# Patient Record
Sex: FEMALE | URBAN - METROPOLITAN AREA
[De-identification: names, ages, dates, MRNs, and addresses within clinical notes are randomized per-mention and may not be internally consistent; named-entity substitution may affect disease eponyms.]

---

## 2018-07-27 PROCEDURE — 88305 TISSUE EXAM BY PATHOLOGIST: CPT | Performed by: INTERNAL MEDICINE

## 2019-10-19 PROBLEM — K59.9 BOWEL DYSFUNCTION: Status: ACTIVE | Noted: 2019-10-19

## 2022-04-28 ENCOUNTER — APPOINTMENT (OUTPATIENT)
Dept: URBAN - METROPOLITAN AREA CLINIC 249 | Age: 72
Setting detail: DERMATOLOGY
End: 2022-04-28

## 2022-04-28 DIAGNOSIS — Z41.9 ENCOUNTER FOR PROCEDURE FOR PURPOSES OTHER THAN REMEDYING HEALTH STATE, UNSPECIFIED: ICD-10-CM

## 2022-04-28 PROCEDURE — OTHER CHEMICAL PEEL: OTHER

## 2022-04-28 NOTE — PROCEDURE: CHEMICAL PEEL
Price (Use Numbers Only, No Special Characters Or $): 922 Price (Use Numbers Only, No Special Characters Or $): 815

## 2022-06-23 ENCOUNTER — APPOINTMENT (OUTPATIENT)
Dept: URBAN - METROPOLITAN AREA CLINIC 249 | Age: 72
Setting detail: DERMATOLOGY
End: 2022-06-23

## 2022-06-23 DIAGNOSIS — Z41.9 ENCOUNTER FOR PROCEDURE FOR PURPOSES OTHER THAN REMEDYING HEALTH STATE, UNSPECIFIED: ICD-10-CM

## 2022-06-23 PROCEDURE — OTHER PERMEA LASER: OTHER

## 2022-06-23 ASSESSMENT — LOCATION DETAILED DESCRIPTION DERM
LOCATION DETAILED: LEFT LOWER CUTANEOUS LIP
LOCATION DETAILED: SUPERIOR MID FOREHEAD
LOCATION DETAILED: RIGHT INFERIOR CENTRAL MALAR CHEEK
LOCATION DETAILED: LEFT INFERIOR CENTRAL MALAR CHEEK

## 2022-06-23 ASSESSMENT — LOCATION ZONE DERM
LOCATION ZONE: FACE
LOCATION ZONE: LIP

## 2022-06-23 ASSESSMENT — LOCATION SIMPLE DESCRIPTION DERM
LOCATION SIMPLE: SUPERIOR FOREHEAD
LOCATION SIMPLE: RIGHT CHEEK
LOCATION SIMPLE: LEFT LIP
LOCATION SIMPLE: LEFT CHEEK

## 2022-06-23 NOTE — PROCEDURE: PERMEA LASER
Energy Level: High
Consent: Written consent obtained, risks reviewed including but not limited to crusting, scabbing, blistering, scarring, darker or lighter pigmentary change, incidental hair removal, bruising, and/or incomplete removal.
Laser Type: Raquela
Detail Level: Zone
Price (Use Numbers Only, No Special Characters Or $): 400
Topical Anesthesia?: 7% lidocaine, 7% tetracaine
Post-Procedure Care: Permea on face and neck. Phyto and Elta sunscreen. Purchased CHARY 1/2  off. First done.
Post-Care Instructions: I reviewed with the patient in detail post-care instructions. Patient should stay away from the sun and wear sun protection until treated areas are fully healed.

## 2022-07-13 ENCOUNTER — APPOINTMENT (OUTPATIENT)
Dept: URBAN - METROPOLITAN AREA CLINIC 249 | Age: 72
Setting detail: DERMATOLOGY
End: 2022-07-13

## 2022-07-13 DIAGNOSIS — L72.0 EPIDERMAL CYST: ICD-10-CM

## 2022-07-13 DIAGNOSIS — L57.8 OTHER SKIN CHANGES DUE TO CHRONIC EXPOSURE TO NONIONIZING RADIATION: ICD-10-CM

## 2022-07-13 PROCEDURE — OTHER COUNSELING: OTHER

## 2022-07-13 PROCEDURE — 99213 OFFICE O/P EST LOW 20 MIN: CPT

## 2022-07-13 ASSESSMENT — LOCATION SIMPLE DESCRIPTION DERM
LOCATION SIMPLE: LEFT CHEEK
LOCATION SIMPLE: LEFT UPPER ARM
LOCATION SIMPLE: RIGHT UPPER ARM
LOCATION SIMPLE: RIGHT PRETIBIAL REGION
LOCATION SIMPLE: RIGHT CHEEK
LOCATION SIMPLE: RIGHT FOREHEAD
LOCATION SIMPLE: LEFT PRETIBIAL REGION

## 2022-07-13 ASSESSMENT — LOCATION ZONE DERM
LOCATION ZONE: LEG
LOCATION ZONE: ARM
LOCATION ZONE: FACE

## 2022-07-13 ASSESSMENT — LOCATION DETAILED DESCRIPTION DERM
LOCATION DETAILED: LEFT CENTRAL MALAR CHEEK
LOCATION DETAILED: LEFT PROXIMAL PRETIBIAL REGION
LOCATION DETAILED: RIGHT CENTRAL MALAR CHEEK
LOCATION DETAILED: RIGHT PROXIMAL PRETIBIAL REGION
LOCATION DETAILED: RIGHT ANTERIOR PROXIMAL UPPER ARM
LOCATION DETAILED: RIGHT INFERIOR LATERAL FOREHEAD
LOCATION DETAILED: LEFT ANTERIOR DISTAL UPPER ARM

## 2022-08-25 ENCOUNTER — APPOINTMENT (OUTPATIENT)
Dept: URBAN - METROPOLITAN AREA CLINIC 249 | Age: 72
Setting detail: DERMATOLOGY
End: 2022-08-25

## 2022-08-25 DIAGNOSIS — Z41.9 ENCOUNTER FOR PROCEDURE FOR PURPOSES OTHER THAN REMEDYING HEALTH STATE, UNSPECIFIED: ICD-10-CM

## 2022-08-25 PROCEDURE — OTHER PERMEA LASER: OTHER

## 2022-08-25 ASSESSMENT — LOCATION ZONE DERM
LOCATION ZONE: NOSE
LOCATION ZONE: NECK
LOCATION ZONE: HAND
LOCATION ZONE: FACE

## 2022-08-25 ASSESSMENT — LOCATION DETAILED DESCRIPTION DERM
LOCATION DETAILED: RIGHT ULNAR DORSAL HAND
LOCATION DETAILED: LEFT FOREHEAD
LOCATION DETAILED: LEFT RADIAL DORSAL HAND
LOCATION DETAILED: LEFT INFERIOR ANTERIOR NECK
LOCATION DETAILED: NASAL DORSUM
LOCATION DETAILED: RIGHT CENTRAL MALAR CHEEK
LOCATION DETAILED: RIGHT FOREHEAD
LOCATION DETAILED: LEFT INFERIOR CENTRAL MALAR CHEEK
LOCATION DETAILED: LEFT CHIN

## 2022-08-25 ASSESSMENT — LOCATION SIMPLE DESCRIPTION DERM
LOCATION SIMPLE: RIGHT HAND
LOCATION SIMPLE: NOSE
LOCATION SIMPLE: LEFT HAND
LOCATION SIMPLE: LEFT FOREHEAD
LOCATION SIMPLE: RIGHT FOREHEAD
LOCATION SIMPLE: LEFT CHEEK
LOCATION SIMPLE: CHIN
LOCATION SIMPLE: RIGHT CHEEK
LOCATION SIMPLE: LEFT ANTERIOR NECK

## 2022-08-25 NOTE — PROCEDURE: PERMEA LASER
Length Of Topical Anesthesia Application (Optional): 15 minutes
Consent: Written consent obtained, risks reviewed including but not limited to crusting, scabbing, blistering, scarring, darker or lighter pigmentary change, incidental hair removal, bruising, and/or incomplete removal.
Post-Care Instructions: I reviewed with the patient in detail post-care instructions. Patient should stay away from the sun and wear sun protection until treated areas are fully healed.
Post-Procedure Care: Permea on face, neck and hands. 2nd of radha 1/2 off.
Detail Level: Zone
Laser Type: Raquela
Price (Use Numbers Only, No Special Characters Or $): 400
Energy Level: High
Topical Anesthesia?: 7% lidocaine, 7% tetracaine

## 2022-09-09 ENCOUNTER — APPOINTMENT (OUTPATIENT)
Dept: URBAN - METROPOLITAN AREA CLINIC 249 | Age: 72
Setting detail: DERMATOLOGY
End: 2022-09-09

## 2022-09-09 DIAGNOSIS — Z41.9 ENCOUNTER FOR PROCEDURE FOR PURPOSES OTHER THAN REMEDYING HEALTH STATE, UNSPECIFIED: ICD-10-CM

## 2022-09-09 PROCEDURE — OTHER CHEMICAL PEEL: OTHER

## 2022-09-09 ASSESSMENT — LOCATION DETAILED DESCRIPTION DERM
LOCATION DETAILED: SUPERIOR MID FOREHEAD
LOCATION DETAILED: LEFT MEDIAL SUPERIOR CHEST
LOCATION DETAILED: LEFT INFERIOR ANTERIOR NECK
LOCATION DETAILED: LEFT CHIN
LOCATION DETAILED: NASAL TIP
LOCATION DETAILED: PHILTRUM
LOCATION DETAILED: LEFT RADIAL DORSAL HAND
LOCATION DETAILED: RIGHT INFERIOR CENTRAL MALAR CHEEK
LOCATION DETAILED: LEFT INFERIOR CENTRAL MALAR CHEEK
LOCATION DETAILED: RIGHT RADIAL DORSAL HAND

## 2022-09-09 ASSESSMENT — LOCATION SIMPLE DESCRIPTION DERM
LOCATION SIMPLE: LEFT HAND
LOCATION SIMPLE: LEFT CHEEK
LOCATION SIMPLE: SUPERIOR FOREHEAD
LOCATION SIMPLE: RIGHT HAND
LOCATION SIMPLE: CHEST
LOCATION SIMPLE: CHIN
LOCATION SIMPLE: RIGHT CHEEK
LOCATION SIMPLE: LEFT ANTERIOR NECK
LOCATION SIMPLE: NOSE
LOCATION SIMPLE: UPPER LIP

## 2022-09-09 ASSESSMENT — LOCATION ZONE DERM
LOCATION ZONE: NECK
LOCATION ZONE: LIP
LOCATION ZONE: HAND
LOCATION ZONE: FACE
LOCATION ZONE: TRUNK
LOCATION ZONE: NOSE

## 2022-09-09 NOTE — PROCEDURE: CHEMICAL PEEL
Price (Use Numbers Only, No Special Characters Or $): 848 Price (Use Numbers Only, No Special Characters Or $): 107

## 2024-04-24 RX ORDER — TROSPIUM CHLORIDE 20 MG/1
20 TABLET, FILM COATED ORAL 2 TIMES DAILY
Status: ON HOLD | COMMUNITY
End: 2024-05-17

## 2024-04-24 RX ORDER — LEVOTHYROXINE SODIUM 125 UG/ML
125 SOLUTION ORAL DAILY
COMMUNITY

## 2024-04-24 RX ORDER — DIAZEPAM 5 MG/1
5 TABLET ORAL DAILY
COMMUNITY

## 2024-04-24 RX ORDER — BACLOFEN 10 MG/1
10 TABLET ORAL DAILY
COMMUNITY
End: 2024-05-24

## 2024-05-09 ENCOUNTER — LAB ENCOUNTER (OUTPATIENT)
Dept: LAB | Age: 74
End: 2024-05-09
Attending: UROLOGY
Payer: MEDICARE

## 2024-05-09 DIAGNOSIS — R10.2 PELVIC PAIN IN FEMALE: ICD-10-CM

## 2024-05-09 LAB
ANTIBODY SCREEN: NEGATIVE
RH BLOOD TYPE: POSITIVE

## 2024-05-09 PROCEDURE — 86850 RBC ANTIBODY SCREEN: CPT

## 2024-05-09 PROCEDURE — 86900 BLOOD TYPING SEROLOGIC ABO: CPT

## 2024-05-09 PROCEDURE — 86901 BLOOD TYPING SEROLOGIC RH(D): CPT

## 2024-05-17 ENCOUNTER — ANESTHESIA EVENT (OUTPATIENT)
Dept: SURGERY | Facility: HOSPITAL | Age: 74
DRG: 654 | End: 2024-05-17

## 2024-05-17 ENCOUNTER — HOSPITAL ENCOUNTER (INPATIENT)
Facility: HOSPITAL | Age: 74
LOS: 7 days | Discharge: HOME HEALTH | DRG: 654 | End: 2024-05-24
Attending: UROLOGY | Admitting: UROLOGY

## 2024-05-17 ENCOUNTER — ANESTHESIA (OUTPATIENT)
Dept: SURGERY | Facility: HOSPITAL | Age: 74
DRG: 654 | End: 2024-05-17

## 2024-05-17 DIAGNOSIS — R10.2 PELVIC PAIN IN FEMALE: Primary | ICD-10-CM

## 2024-05-17 LAB
BASE EXCESS BLD CALC-SCNC: -3 MMOL/L
CA-I BLD-SCNC: 1.21 MMOL/L (ref 1.12–1.32)
CO2 BLD-SCNC: 23 MMOL/L (ref 22–32)
GLUCOSE BLD-MCNC: 122 MG/DL (ref 70–99)
HCO3 BLD-SCNC: 21.5 MEQ/L
HCT VFR BLD CALC: 32 %
PCO2 BLD: 34.7 MMHG
PH BLD: 7.4 [PH]
PO2 BLD: 546 MMHG
POTASSIUM BLD-SCNC: 3.1 MMOL/L (ref 3.6–5.1)
RH BLOOD TYPE: POSITIVE
SAO2 % BLD: 100 %
SODIUM BLD-SCNC: 140 MMOL/L (ref 136–145)

## 2024-05-17 PROCEDURE — 84295 ASSAY OF SERUM SODIUM: CPT

## 2024-05-17 PROCEDURE — 0JPT0WZ REMOVAL OF TOTALLY IMPLANTABLE VASCULAR ACCESS DEVICE FROM TRUNK SUBCUTANEOUS TISSUE AND FASCIA, OPEN APPROACH: ICD-10-PCS | Performed by: SURGERY

## 2024-05-17 PROCEDURE — 85014 HEMATOCRIT: CPT

## 2024-05-17 PROCEDURE — 84132 ASSAY OF SERUM POTASSIUM: CPT

## 2024-05-17 PROCEDURE — 8E0W4CZ ROBOTIC ASSISTED PROCEDURE OF TRUNK REGION, PERCUTANEOUS ENDOSCOPIC APPROACH: ICD-10-PCS | Performed by: UROLOGY

## 2024-05-17 PROCEDURE — 0T184ZC BYPASS BILATERAL URETERS TO ILEOCUTANEOUS, PERCUTANEOUS ENDOSCOPIC APPROACH: ICD-10-PCS | Performed by: UROLOGY

## 2024-05-17 PROCEDURE — 76942 ECHO GUIDE FOR BIOPSY: CPT | Performed by: STUDENT IN AN ORGANIZED HEALTH CARE EDUCATION/TRAINING PROGRAM

## 2024-05-17 PROCEDURE — 0TTB4ZZ RESECTION OF BLADDER, PERCUTANEOUS ENDOSCOPIC APPROACH: ICD-10-PCS | Performed by: UROLOGY

## 2024-05-17 PROCEDURE — 82330 ASSAY OF CALCIUM: CPT

## 2024-05-17 PROCEDURE — 03HY32Z INSERTION OF MONITORING DEVICE INTO UPPER ARTERY, PERCUTANEOUS APPROACH: ICD-10-PCS | Performed by: STUDENT IN AN ORGANIZED HEALTH CARE EDUCATION/TRAINING PROGRAM

## 2024-05-17 PROCEDURE — 3E0T3BZ INTRODUCTION OF ANESTHETIC AGENT INTO PERIPHERAL NERVES AND PLEXI, PERCUTANEOUS APPROACH: ICD-10-PCS | Performed by: STUDENT IN AN ORGANIZED HEALTH CARE EDUCATION/TRAINING PROGRAM

## 2024-05-17 PROCEDURE — 4A133B1 MONITORING OF ARTERIAL PRESSURE, PERIPHERAL, PERCUTANEOUS APPROACH: ICD-10-PCS | Performed by: STUDENT IN AN ORGANIZED HEALTH CARE EDUCATION/TRAINING PROGRAM

## 2024-05-17 PROCEDURE — 4A133J1 MONITORING OF ARTERIAL PULSE, PERIPHERAL, PERCUTANEOUS APPROACH: ICD-10-PCS | Performed by: STUDENT IN AN ORGANIZED HEALTH CARE EDUCATION/TRAINING PROGRAM

## 2024-05-17 PROCEDURE — 82803 BLOOD GASES ANY COMBINATION: CPT

## 2024-05-17 PROCEDURE — 05PY03Z REMOVAL OF INFUSION DEVICE FROM UPPER VEIN, OPEN APPROACH: ICD-10-PCS | Performed by: SURGERY

## 2024-05-17 PROCEDURE — 88309 TISSUE EXAM BY PATHOLOGIST: CPT | Performed by: UROLOGY

## 2024-05-17 DEVICE — POLYURETHANE FEEDING TUBE RADIOPAQUE
Type: IMPLANTABLE DEVICE | Site: URETER | Status: FUNCTIONAL
Brand: KANGAROO

## 2024-05-17 RX ORDER — HYDROMORPHONE HYDROCHLORIDE 1 MG/ML
0.4 INJECTION, SOLUTION INTRAMUSCULAR; INTRAVENOUS; SUBCUTANEOUS EVERY 5 MIN PRN
Status: DISCONTINUED | OUTPATIENT
Start: 2024-05-17 | End: 2024-05-17 | Stop reason: HOSPADM

## 2024-05-17 RX ORDER — ACETAMINOPHEN 10 MG/ML
1000 INJECTION, SOLUTION INTRAVENOUS ONCE
Status: DISCONTINUED | OUTPATIENT
Start: 2024-05-17 | End: 2024-05-17

## 2024-05-17 RX ORDER — HYDROCODONE BITARTRATE AND ACETAMINOPHEN 5; 325 MG/1; MG/1
1 TABLET ORAL ONCE AS NEEDED
Status: DISCONTINUED | OUTPATIENT
Start: 2024-05-17 | End: 2024-05-17 | Stop reason: HOSPADM

## 2024-05-17 RX ORDER — MINERAL OIL AND PETROLATUM 150; 830 MG/G; MG/G
OINTMENT OPHTHALMIC AS NEEDED
Status: DISCONTINUED | OUTPATIENT
Start: 2024-05-17 | End: 2024-05-17

## 2024-05-17 RX ORDER — FAMOTIDINE 10 MG/ML
20 INJECTION, SOLUTION INTRAVENOUS 2 TIMES DAILY
Status: DISCONTINUED | OUTPATIENT
Start: 2024-05-17 | End: 2024-05-24

## 2024-05-17 RX ORDER — ONDANSETRON 2 MG/ML
4 INJECTION INTRAMUSCULAR; INTRAVENOUS EVERY 6 HOURS PRN
Status: DISCONTINUED | OUTPATIENT
Start: 2024-05-17 | End: 2024-05-24

## 2024-05-17 RX ORDER — BUPIVACAINE HYDROCHLORIDE AND EPINEPHRINE 2.5; 5 MG/ML; UG/ML
INJECTION, SOLUTION EPIDURAL; INFILTRATION; INTRACAUDAL; PERINEURAL AS NEEDED
Status: DISCONTINUED | OUTPATIENT
Start: 2024-05-17 | End: 2024-05-17 | Stop reason: SURG

## 2024-05-17 RX ORDER — KETOROLAC TROMETHAMINE 30 MG/ML
INJECTION, SOLUTION INTRAMUSCULAR; INTRAVENOUS AS NEEDED
Status: DISCONTINUED | OUTPATIENT
Start: 2024-05-17 | End: 2024-05-17 | Stop reason: SURG

## 2024-05-17 RX ORDER — ACETAMINOPHEN 500 MG
1000 TABLET ORAL ONCE AS NEEDED
Status: DISCONTINUED | OUTPATIENT
Start: 2024-05-17 | End: 2024-05-17 | Stop reason: HOSPADM

## 2024-05-17 RX ORDER — OXYCODONE HYDROCHLORIDE 10 MG/1
5 TABLET ORAL EVERY 4 HOURS PRN
Status: DISCONTINUED | OUTPATIENT
Start: 2024-05-17 | End: 2024-05-24

## 2024-05-17 RX ORDER — HYDROMORPHONE HYDROCHLORIDE 1 MG/ML
0.4 INJECTION, SOLUTION INTRAMUSCULAR; INTRAVENOUS; SUBCUTANEOUS EVERY 2 HOUR PRN
Status: DISCONTINUED | OUTPATIENT
Start: 2024-05-17 | End: 2024-05-24

## 2024-05-17 RX ORDER — LABETALOL HYDROCHLORIDE 5 MG/ML
5 INJECTION, SOLUTION INTRAVENOUS EVERY 5 MIN PRN
Status: DISCONTINUED | OUTPATIENT
Start: 2024-05-17 | End: 2024-05-17 | Stop reason: HOSPADM

## 2024-05-17 RX ORDER — DIAZEPAM 5 MG/1
5 TABLET ORAL DAILY
Status: DISCONTINUED | OUTPATIENT
Start: 2024-05-17 | End: 2024-05-24

## 2024-05-17 RX ORDER — ACETAMINOPHEN 500 MG
1000 TABLET ORAL ONCE
Status: DISCONTINUED | OUTPATIENT
Start: 2024-05-17 | End: 2024-05-17 | Stop reason: HOSPADM

## 2024-05-17 RX ORDER — OXYCODONE HYDROCHLORIDE 5 MG/1
2.5 TABLET ORAL EVERY 4 HOURS PRN
Status: DISCONTINUED | OUTPATIENT
Start: 2024-05-17 | End: 2024-05-24

## 2024-05-17 RX ORDER — PHENYLEPHRINE HCL 10 MG/ML
VIAL (ML) INJECTION AS NEEDED
Status: DISCONTINUED | OUTPATIENT
Start: 2024-05-17 | End: 2024-05-17 | Stop reason: SURG

## 2024-05-17 RX ORDER — MEPERIDINE HYDROCHLORIDE 25 MG/ML
12.5 INJECTION INTRAMUSCULAR; INTRAVENOUS; SUBCUTANEOUS AS NEEDED
Status: DISCONTINUED | OUTPATIENT
Start: 2024-05-17 | End: 2024-05-17 | Stop reason: HOSPADM

## 2024-05-17 RX ORDER — SODIUM CHLORIDE, SODIUM LACTATE, POTASSIUM CHLORIDE, CALCIUM CHLORIDE 600; 310; 30; 20 MG/100ML; MG/100ML; MG/100ML; MG/100ML
INJECTION, SOLUTION INTRAVENOUS CONTINUOUS
Status: DISCONTINUED | OUTPATIENT
Start: 2024-05-17 | End: 2024-05-17

## 2024-05-17 RX ORDER — ACETAMINOPHEN 325 MG/1
650 TABLET ORAL
Status: DISCONTINUED | OUTPATIENT
Start: 2024-05-17 | End: 2024-05-24

## 2024-05-17 RX ORDER — LIDOCAINE HYDROCHLORIDE 10 MG/ML
INJECTION, SOLUTION INFILTRATION; PERINEURAL AS NEEDED
Status: DISCONTINUED | OUTPATIENT
Start: 2024-05-17 | End: 2024-05-17 | Stop reason: HOSPADM

## 2024-05-17 RX ORDER — BACLOFEN 5 MG/1
10 TABLET ORAL 2 TIMES DAILY
Status: DISCONTINUED | OUTPATIENT
Start: 2024-05-17 | End: 2024-05-24

## 2024-05-17 RX ORDER — HEPARIN SODIUM 5000 [USP'U]/ML
5000 INJECTION, SOLUTION INTRAVENOUS; SUBCUTANEOUS ONCE
Status: COMPLETED | OUTPATIENT
Start: 2024-05-17 | End: 2024-05-17

## 2024-05-17 RX ORDER — DIPHENHYDRAMINE HYDROCHLORIDE 50 MG/ML
INJECTION INTRAMUSCULAR; INTRAVENOUS AS NEEDED
Status: DISCONTINUED | OUTPATIENT
Start: 2024-05-17 | End: 2024-05-17 | Stop reason: SURG

## 2024-05-17 RX ORDER — ROCURONIUM BROMIDE 10 MG/ML
INJECTION, SOLUTION INTRAVENOUS AS NEEDED
Status: DISCONTINUED | OUTPATIENT
Start: 2024-05-17 | End: 2024-05-17 | Stop reason: SURG

## 2024-05-17 RX ORDER — HYDROMORPHONE HYDROCHLORIDE 1 MG/ML
0.2 INJECTION, SOLUTION INTRAMUSCULAR; INTRAVENOUS; SUBCUTANEOUS EVERY 5 MIN PRN
Status: DISCONTINUED | OUTPATIENT
Start: 2024-05-17 | End: 2024-05-17 | Stop reason: HOSPADM

## 2024-05-17 RX ORDER — SODIUM CHLORIDE, SODIUM LACTATE, POTASSIUM CHLORIDE, CALCIUM CHLORIDE 600; 310; 30; 20 MG/100ML; MG/100ML; MG/100ML; MG/100ML
INJECTION, SOLUTION INTRAVENOUS CONTINUOUS
Status: DISCONTINUED | OUTPATIENT
Start: 2024-05-17 | End: 2024-05-17 | Stop reason: HOSPADM

## 2024-05-17 RX ORDER — ACETAMINOPHEN 10 MG/ML
INJECTION, SOLUTION INTRAVENOUS
Status: COMPLETED
Start: 2024-05-17 | End: 2024-05-17

## 2024-05-17 RX ORDER — METOCLOPRAMIDE HYDROCHLORIDE 5 MG/ML
10 INJECTION INTRAMUSCULAR; INTRAVENOUS EVERY 8 HOURS PRN
Status: DISCONTINUED | OUTPATIENT
Start: 2024-05-17 | End: 2024-05-17 | Stop reason: HOSPADM

## 2024-05-17 RX ORDER — MAGNESIUM SULFATE HEPTAHYDRATE 500 MG/ML
INJECTION, SOLUTION INTRAMUSCULAR; INTRAVENOUS AS NEEDED
Status: DISCONTINUED | OUTPATIENT
Start: 2024-05-17 | End: 2024-05-17 | Stop reason: SURG

## 2024-05-17 RX ORDER — KETAMINE HYDROCHLORIDE 50 MG/ML
INJECTION, SOLUTION INTRAMUSCULAR; INTRAVENOUS AS NEEDED
Status: DISCONTINUED | OUTPATIENT
Start: 2024-05-17 | End: 2024-05-17 | Stop reason: SURG

## 2024-05-17 RX ORDER — ONDANSETRON 2 MG/ML
4 INJECTION INTRAMUSCULAR; INTRAVENOUS EVERY 6 HOURS PRN
Status: DISCONTINUED | OUTPATIENT
Start: 2024-05-17 | End: 2024-05-17 | Stop reason: HOSPADM

## 2024-05-17 RX ORDER — ACETAMINOPHEN 10 MG/ML
1000 INJECTION, SOLUTION INTRAVENOUS ONCE
Status: COMPLETED | OUTPATIENT
Start: 2024-05-17 | End: 2024-05-17

## 2024-05-17 RX ORDER — FAMOTIDINE 20 MG/1
20 TABLET, FILM COATED ORAL 2 TIMES DAILY
Status: DISCONTINUED | OUTPATIENT
Start: 2024-05-17 | End: 2024-05-24

## 2024-05-17 RX ORDER — ESMOLOL HYDROCHLORIDE 10 MG/ML
INJECTION INTRAVENOUS AS NEEDED
Status: DISCONTINUED | OUTPATIENT
Start: 2024-05-17 | End: 2024-05-17 | Stop reason: SURG

## 2024-05-17 RX ORDER — HYDROMORPHONE HYDROCHLORIDE 1 MG/ML
0.6 INJECTION, SOLUTION INTRAMUSCULAR; INTRAVENOUS; SUBCUTANEOUS EVERY 5 MIN PRN
Status: DISCONTINUED | OUTPATIENT
Start: 2024-05-17 | End: 2024-05-17 | Stop reason: HOSPADM

## 2024-05-17 RX ORDER — HYDROCODONE BITARTRATE AND ACETAMINOPHEN 5; 325 MG/1; MG/1
2 TABLET ORAL ONCE AS NEEDED
Status: DISCONTINUED | OUTPATIENT
Start: 2024-05-17 | End: 2024-05-17 | Stop reason: HOSPADM

## 2024-05-17 RX ORDER — ENOXAPARIN SODIUM 100 MG/ML
40 INJECTION SUBCUTANEOUS DAILY
Status: DISCONTINUED | OUTPATIENT
Start: 2024-05-17 | End: 2024-05-24

## 2024-05-17 RX ORDER — HYDROMORPHONE HYDROCHLORIDE 1 MG/ML
INJECTION, SOLUTION INTRAMUSCULAR; INTRAVENOUS; SUBCUTANEOUS
Status: COMPLETED
Start: 2024-05-17 | End: 2024-05-17

## 2024-05-17 RX ORDER — DEXAMETHASONE SODIUM PHOSPHATE 4 MG/ML
VIAL (ML) INJECTION AS NEEDED
Status: DISCONTINUED | OUTPATIENT
Start: 2024-05-17 | End: 2024-05-17 | Stop reason: SURG

## 2024-05-17 RX ORDER — SODIUM CHLORIDE 9 MG/ML
INJECTION, SOLUTION INTRAVENOUS CONTINUOUS PRN
Status: DISCONTINUED | OUTPATIENT
Start: 2024-05-17 | End: 2024-05-17 | Stop reason: SURG

## 2024-05-17 RX ORDER — HYDROMORPHONE HYDROCHLORIDE 1 MG/ML
0.2 INJECTION, SOLUTION INTRAMUSCULAR; INTRAVENOUS; SUBCUTANEOUS EVERY 2 HOUR PRN
Status: DISCONTINUED | OUTPATIENT
Start: 2024-05-17 | End: 2024-05-24

## 2024-05-17 RX ORDER — EPHEDRINE SULFATE 50 MG/ML
INJECTION INTRAVENOUS AS NEEDED
Status: DISCONTINUED | OUTPATIENT
Start: 2024-05-17 | End: 2024-05-17 | Stop reason: SURG

## 2024-05-17 RX ORDER — ONDANSETRON 2 MG/ML
INJECTION INTRAMUSCULAR; INTRAVENOUS AS NEEDED
Status: DISCONTINUED | OUTPATIENT
Start: 2024-05-17 | End: 2024-05-17 | Stop reason: SURG

## 2024-05-17 RX ORDER — LIDOCAINE HYDROCHLORIDE 10 MG/ML
INJECTION, SOLUTION EPIDURAL; INFILTRATION; INTRACAUDAL; PERINEURAL AS NEEDED
Status: DISCONTINUED | OUTPATIENT
Start: 2024-05-17 | End: 2024-05-17 | Stop reason: SURG

## 2024-05-17 RX ORDER — NALOXONE HYDROCHLORIDE 0.4 MG/ML
0.08 INJECTION, SOLUTION INTRAMUSCULAR; INTRAVENOUS; SUBCUTANEOUS AS NEEDED
Status: DISCONTINUED | OUTPATIENT
Start: 2024-05-17 | End: 2024-05-17 | Stop reason: HOSPADM

## 2024-05-17 RX ORDER — DOCUSATE SODIUM 100 MG/1
100 CAPSULE, LIQUID FILLED ORAL 2 TIMES DAILY
Status: DISCONTINUED | OUTPATIENT
Start: 2024-05-17 | End: 2024-05-24

## 2024-05-17 RX ORDER — SENNOSIDES 8.6 MG
17.2 TABLET ORAL NIGHTLY
Status: DISCONTINUED | OUTPATIENT
Start: 2024-05-17 | End: 2024-05-24

## 2024-05-17 RX ORDER — METOCLOPRAMIDE HYDROCHLORIDE 5 MG/ML
10 INJECTION INTRAMUSCULAR; INTRAVENOUS EVERY 8 HOURS PRN
Status: DISCONTINUED | OUTPATIENT
Start: 2024-05-17 | End: 2024-05-24

## 2024-05-17 RX ADMIN — ROCURONIUM BROMIDE 20 MG: 10 INJECTION, SOLUTION INTRAVENOUS at 10:51:00

## 2024-05-17 RX ADMIN — ESMOLOL HYDROCHLORIDE 30 MG: 10 INJECTION INTRAVENOUS at 08:18:00

## 2024-05-17 RX ADMIN — ROCURONIUM BROMIDE 50 MG: 10 INJECTION, SOLUTION INTRAVENOUS at 07:37:00

## 2024-05-17 RX ADMIN — ONDANSETRON 4 MG: 2 INJECTION INTRAMUSCULAR; INTRAVENOUS at 12:32:00

## 2024-05-17 RX ADMIN — KETAMINE HYDROCHLORIDE 25 MG: 50 INJECTION, SOLUTION INTRAMUSCULAR; INTRAVENOUS at 08:25:00

## 2024-05-17 RX ADMIN — PHENYLEPHRINE HCL 100 MCG: 10 MG/ML VIAL (ML) INJECTION at 08:31:00

## 2024-05-17 RX ADMIN — ESMOLOL HYDROCHLORIDE 40 MG: 10 INJECTION INTRAVENOUS at 07:34:00

## 2024-05-17 RX ADMIN — SODIUM CHLORIDE, SODIUM LACTATE, POTASSIUM CHLORIDE, CALCIUM CHLORIDE: 600; 310; 30; 20 INJECTION, SOLUTION INTRAVENOUS at 08:55:00

## 2024-05-17 RX ADMIN — PHENYLEPHRINE HCL 100 MCG: 10 MG/ML VIAL (ML) INJECTION at 12:30:00

## 2024-05-17 RX ADMIN — SODIUM CHLORIDE, SODIUM LACTATE, POTASSIUM CHLORIDE, CALCIUM CHLORIDE: 600; 310; 30; 20 INJECTION, SOLUTION INTRAVENOUS at 07:31:00

## 2024-05-17 RX ADMIN — DEXAMETHASONE SODIUM PHOSPHATE 4 MG: 4 MG/ML VIAL (ML) INJECTION at 08:02:00

## 2024-05-17 RX ADMIN — LIDOCAINE HYDROCHLORIDE 20 MG: 10 INJECTION, SOLUTION EPIDURAL; INFILTRATION; INTRACAUDAL; PERINEURAL at 07:37:00

## 2024-05-17 RX ADMIN — BUPIVACAINE HYDROCHLORIDE AND EPINEPHRINE 40 ML: 2.5; 5 INJECTION, SOLUTION EPIDURAL; INFILTRATION; INTRACAUDAL; PERINEURAL at 07:45:00

## 2024-05-17 RX ADMIN — ROCURONIUM BROMIDE 50 MG: 10 INJECTION, SOLUTION INTRAVENOUS at 09:17:00

## 2024-05-17 RX ADMIN — PHENYLEPHRINE HCL 50 MCG: 10 MG/ML VIAL (ML) INJECTION at 09:55:00

## 2024-05-17 RX ADMIN — DIPHENHYDRAMINE HYDROCHLORIDE 12.5 MG: 50 INJECTION INTRAMUSCULAR; INTRAVENOUS at 08:04:00

## 2024-05-17 RX ADMIN — PHENYLEPHRINE HCL 100 MCG: 10 MG/ML VIAL (ML) INJECTION at 08:36:00

## 2024-05-17 RX ADMIN — MAGNESIUM SULFATE HEPTAHYDRATE 2 G: 500 INJECTION, SOLUTION INTRAMUSCULAR; INTRAVENOUS at 08:03:00

## 2024-05-17 RX ADMIN — PHENYLEPHRINE HCL 100 MCG: 10 MG/ML VIAL (ML) INJECTION at 12:27:00

## 2024-05-17 RX ADMIN — SODIUM CHLORIDE, SODIUM LACTATE, POTASSIUM CHLORIDE, CALCIUM CHLORIDE: 600; 310; 30; 20 INJECTION, SOLUTION INTRAVENOUS at 12:05:00

## 2024-05-17 RX ADMIN — SODIUM CHLORIDE: 9 INJECTION, SOLUTION INTRAVENOUS at 07:53:00

## 2024-05-17 RX ADMIN — KETOROLAC TROMETHAMINE 30 MG: 30 INJECTION, SOLUTION INTRAMUSCULAR; INTRAVENOUS at 12:32:00

## 2024-05-17 RX ADMIN — SODIUM CHLORIDE, SODIUM LACTATE, POTASSIUM CHLORIDE, CALCIUM CHLORIDE: 600; 310; 30; 20 INJECTION, SOLUTION INTRAVENOUS at 13:09:00

## 2024-05-17 RX ADMIN — PHENYLEPHRINE HCL 100 MCG: 10 MG/ML VIAL (ML) INJECTION at 10:55:00

## 2024-05-17 RX ADMIN — SODIUM CHLORIDE: 9 INJECTION, SOLUTION INTRAVENOUS at 13:09:00

## 2024-05-17 RX ADMIN — EPHEDRINE SULFATE 10 MG: 50 INJECTION INTRAVENOUS at 08:48:00

## 2024-05-17 RX ADMIN — KETAMINE HYDROCHLORIDE 25 MG: 50 INJECTION, SOLUTION INTRAMUSCULAR; INTRAVENOUS at 10:00:00

## 2024-05-17 RX ADMIN — PHENYLEPHRINE HCL 100 MCG: 10 MG/ML VIAL (ML) INJECTION at 11:54:00

## 2024-05-17 NOTE — ANESTHESIA POSTPROCEDURE EVALUATION
[facility]    Frankie Lemus Patient Status:  Inpatient   Age/Gender 73 year old female MRN [id number]   Location [facility] SURGERY Attending Yael Cadena MD   Hosp Day # 0 PCP PEÑA EVANGELISTA       Anesthesia Post-op Note    XI ROBOTIC ASSISTED LAPAROSCOPIC SIMPLE CYSTECTOMY, ILEAL CONDUIT DIVERSION (MINAOMI ) REMOVAL OF POWER PORT (HIRO)    Procedure Summary       Date: 05/17/24 Room / Location:  MAIN OR 09 / [facility] MAIN OR    Anesthesia Start: 0731 Anesthesia Stop: 1310    Procedures:       XI ROBOTIC ASSISTED LAPAROSCOPIC SIMPLE CYSTECTOMY, ILEAL CONDUIT DIVERSION (MARIO ) REMOVAL OF POWER PORT (HIRO) (Abdomen)      REMOVAL OF POWER PORT (Chest) Diagnosis: (URINARY RETENTION; PELVIC PAIN)    Surgeons: Yael Cadena MD; Svetlana Alejandre MD Anesthesiologist: Casey Broussard MD    Anesthesia Type: general ASA Status: 3            Anesthesia Type: general    Vitals Value Taken Time   /67 05/17/24 1307   Temp 98.7 05/17/24 1310   Pulse 97 05/17/24 1310   Resp 13 05/17/24 1310   SpO2 99 % 05/17/24 1310   Vitals shown include unfiled device data.    Patient Location: PACU    Anesthesia Type: general    Airway Patency: patent    Postop Pain Control: adequate    Mental Status: preanesthetic baseline    Nausea/Vomiting: none    Cardiopulmonary/Hydration status: stable euvolemic    Complications: no apparent anesthesia related complications    Postop vital signs: stable    Dental Exam: Unchanged from Preop    Patient to be discharged from PACU when criteria met.

## 2024-05-17 NOTE — ANESTHESIA PROCEDURE NOTES
Regional Block    Performed by: Casey Broussard MD  Authorized by: Casey Broussard MD      General Information and Staff    Start Time:   End Time:  5/17/2024 7:46 AM  Anesthesiologist:  Casey Broussard MD  Performed by:  Anesthesiologist  Patient Location:  OR    Block Placement: Post Induction  Site Identification: real time ultrasound guided and image stored and retrievable    Block site/laterality marked before start: site marked  Reason for Block: at surgeon's request and post-op pain management    Preanesthetic Checklist: 2 patient identifers, IV checked, risks and benefits discussed, monitors and equipment checked, pre-op evaluation, timeout performed, anesthesia consent, sterile technique used, no prohibitive neurological deficits and no local skin infection at insertion site      Procedure Details    Patient Position:  Supine  Prep: ChloraPrep    Monitoring:  Cardiac monitor, continuous pulse ox and blood pressure cuff  Block Type:  TAP  Laterality:  Bilateral  Injection Technique:  Single-shot    Needle    Needle Type:  Short-bevel and echogenic  Needle Gauge:  21 G  Needle Length:  110 mm  Needle Localization:  Ultrasound guidance  Reason for Ultrasound Use: appropriate spread of the medication was noted in real time and no ultrasound evidence of intravascular and/or intraneural injection            Assessment    Injection Assessment:  Good spread noted, negative resistance, negative aspiration for heme, incremental injection and low pressure  Heart Rate Change: No    - Patient tolerated block procedure well without evidence of immediate block related complications.     Medications      Additional Comments    Bilateral subcostal TAP blocks performed using 0.25% bupivacaine + 5 mcg/ml epi, 10cc injected each at 4 sites under ultrasound guidance..

## 2024-05-17 NOTE — ANESTHESIA PROCEDURE NOTES
Airway  Date/Time: 5/17/2024 7:39 AM  Urgency: elective      General Information and Staff    Patient location during procedure: OR  Anesthesiologist: Casey Broussard MD  Performed: anesthesiologist   Performed by: Casey Broussard MD  Authorized by: Casey Broussard MD      Indications and Patient Condition  Indications for airway management: anesthesia  Spontaneous Ventilation: absent  Sedation level: deep  Preoxygenated: yes  Patient position: sniffing  Mask difficulty assessment: 1 - vent by mask    Final Airway Details  Final airway type: endotracheal airway      Successful airway: ETT  Cuffed: yes   Successful intubation technique: direct laryngoscopy  Facilitating devices/methods: intubating stylet  Endotracheal tube insertion site: oral  Blade: Michelle  Blade size: #3  ETT size (mm): 7.0    Cormack-Lehane Classification: grade IIA - partial view of glottis  Placement verified by: capnometry   Measured from: teeth  ETT to teeth (cm): 22  Number of attempts at approach: 1

## 2024-05-17 NOTE — CONSULTS
[facility] General Medicine Consult      Reason for consult:  Post op medical management     Consulted by: Dr. Cadena     PCP: PEÑA EVANGELISTA      History of Present Illness: Patient is a 73 year old female with PMH sig for interstitial cystitis, depression, anxiety, and hypothyroidism who presents s/p robotic assisted lap cystectomy with ileal conduit diversion and removal of port.  She tolerated the procedure well without immediate complications.  Pt denies nausea, c/o post op pain.  No F/C, N/V.      PMH:  Past Medical History:    Anesthesia complication    gets hyped up after surgeries/anesthesiologist    ANXIETY    Bowel dysfunction    CANCER    thyroid    Cancer (HCC)    Crohn's colitis (HCC)    seeing Jose    DEPRESSION    Disorder of thyroid    Exposure to medical diagnostic radiation    last dose 4-2023    HYPOTHYROIDISM    Interstitial cystitis    MENOPAUSE    Other and unspecified peripheral vertigo(386.19)    Personal history of antineoplastic chemotherapy    last chemo 04-    Rectal cancer (HCC)    Visual impairment    readers        PSH:  Past Surgical History:   Procedure Laterality Date    Appendectomy      Colonoscopy      Colonoscopy  07/2018    MAC: tortuous colon, melanosis, 3 mm cecal polyp (tubular adenoma), int hemorrhoids, repeat 2023    Colonoscopy N/A 07/27/2018    Procedure: COLONOSCOPY, POSSIBLE BIOPSY, POSSIBLE POLYPECTOMY 83213;  Surgeon: Helio De La Torre MD;  Location: [facility]    Cystourethroscopy  07/11/2011    Cysto, hydrodistention, rescue solution-      Cystourethroscopy  07/09/2012    cysto, kenalog injection - Dr. Lopez    Cystourethroscopy,fulgur <.5cm lesn  07/11/2011    Performed by MIROSLAVA LOPEZ at [facility]    Cystourethroscopy,ureter catheter  07/11/2011    Performed by MIROSLAVA LOPEZ at [facility], [facility]    Hernia surgery      Hysterectomy  03/18/2009    fibroids ([place])    Knee surgery Left     acl  reconstruction-left knee    Knee surgery      arthoscopic x 3 knee    Other surgical history  11/25/2009    revision hysterectomy incision ([place])    Other surgical history  08/02/2010    Interstim trial Dr. Salamanca    Other surgical history  01/2012    nerve blocks with pain management team - no lasting effect    Thyroid ablation      thyroid    X-ray retrograde pyelogram  07/11/2011    Performed by MIROSLAVA DAILY at [facility] [place]        Home Medications:  Outpatient Medications Marked as Taking for the 5/17/24 encounter (Hospital Encounter)   Medication Sig Dispense Refill    NON FORMULARY See Admin Instructions. THC 5mg 1:1 CBD uses 1 square once at night.      Multiple Vitamin (MULTIVITAMIN OR) Take by mouth See Admin Instructions. Liquid squirt bottle called Quick Silver once a day.      diazePAM 5 MG Oral Tab Take 1 tablet (5 mg total) by mouth daily.      baclofen 10 MG Oral Tab 1 tablet (10 mg total) daily. PLACE VAGINALLY AS NEEDED FOR BLADDER/VAGINAL PAIN UP TO TWO TIMES DAILY. NOT ORAL. STAY RECUMBENT FOR 20 MINUTES TO ENSURE ABSORPTION OF TABLET      NON FORMULARY Juvan  collagen wound healer drink daily.  Powder.      Levothyroxine Sodium (TIROSINT-SOL) 125 MCG/ML Oral Solution Take 125 mcg by mouth daily. TAKE CONtENTS OF ONE AMPULE BY MOUTH DAILY ON AN EMPTY STOMACH      NON FORMULARY COMPOUND OF LEVOTHYROXINE 35MCG AND C-T3      Syringe, Disposable, (SHIRA SYRINGE) 70 ML Does not apply Misc Use as directed 6 each 11    Cholecalciferol (VITAMIN D3) 5000 UNIT Oral Cap Take 1 capsule (5,000 Units total) by mouth daily.         Scheduled Medication:   baclofen  10 mg Oral BID    diazePAM  5 mg Oral Daily    Levothyroxine Sodium  125 mcg Oral Daily    enoxaparin  40 mg Subcutaneous Daily    sennosides  17.2 mg Oral Nightly    docusate sodium  100 mg Oral BID    acetaminophen  650 mg Oral Q4H While awake    famotidine  20 mg Oral BID    Or    famotidine  20 mg Intravenous BID    ceFAZolin  2 g  Intravenous Q8H     Continuous Infusing Medication:    PRN Medication:  ondansetron    metoclopramide    oxyCODONE **OR** oxyCODONE    HYDROmorphone **OR** HYDROmorphone     ALL:  Allergies   Allergen Reactions    Fentanyl NAUSEA AND VOMITING    Toradol DIARRHEA     Nausea /vomiting        Soc Hx:  Social History     Tobacco Use    Smoking status: Never    Smokeless tobacco: Never   Substance Use Topics    Alcohol use: No        Fam Hx  History reviewed. No pertinent family history.    Review of Systems  Comprehensive ROS reviewed and negative except for what's stated above.  Including negative for chest pain, shortness of breath, syncope.       OBJECTIVE:  /60   Pulse 87   Temp 98.1 °F (36.7 °C) (Temporal)   Resp 12   Ht 5' 9\" (1.753 m)   Wt 123 lb 7.3 oz (56 kg)   SpO2 94%   BMI 18.23 kg/m²   Gen: No acute distress, alert and oriented x3, no focal neurologic deficits  HEENT:  EOMI, PERRLA, OP clear, MMM  Pulm: Lungs clear bilaterally, normal respiratory effort  CV: Heart with regular rate and rhythm, no murmur.  Normal PMI.    Abd: Lap incisions clean.  +urostomy.   MSK: Full range of motion in extremities, no clubbing, no cyanosis  Skin: no rashes or lesions  Neuro:  Grossly intact, no sensory deficits     Diagnostics:   CBC/Chem  No results for input(s): \"WBC\", \"HGB\", \"MCV\", \"PLT\", \"BAND\", \"INR\" in the last 168 hours.    Invalid input(s): \"LYM#\", \"MONO#\", \"BASOS#\", \"EOSIN#\"    No results for input(s): \"NA\", \"K\", \"CL\", \"CO2\", \"BUN\", \"CREATSERUM\", \"GLU\", \"CA\", \"CAION\", \"MG\", \"PHOS\" in the last 168 hours.    No results for input(s): \"ALT\", \"AST\", \"ALB\", \"AMYLASE\", \"LIPASE\", \"LDH\" in the last 168 hours.    Invalid input(s): \"ALPHOS\", \"TBIL\", \"DBIL\", \"TPROT\"    Radiology: No results found.       ASSESSMENT / PLAN:    73 yr old female with PMH sig for interstitial cystitis, depression, anxiety, and hypothyroidism who presents s/p robotic assisted lap cystectomy with ileal conduit diversion and removal of  port.      # Chronic urinary retention, interstitial cystitis   S/p robotic assisted lap cystectomy with ileal conduit diversion and removal of port  Post op care per  and gen surg  Encourage IS use    # Hypothyroidism   Cont levothyroxine     # Generalized anxiety d/o  Cont diazepam PRN    Outpatient records reviewed confirming patient's medical history and medications.     Further recommendations pending patient's clinical course.  [facility] hospitalist to continue to follow patient while in house    Patient and/or patient's family given opportunity to ask questions and note understanding and agreeing with therapeutic plan as outlined      Thank you for allowing me to participate in the care of this patient.     Thank You,    Edmond Chacon DO  [facility] Hospitalist  Answering Service number: [phone]

## 2024-05-17 NOTE — ANESTHESIA PROCEDURE NOTES
Arterial Line    Performed by: Casey Broussard MD  Authorized by: Casey Broussard MD    General Information and Staff    Procedure Start:   Procedure End:  5/17/2024 7:50 AM  Anesthesiologist:  Casey Broussard MD  Performed By:  Anesthesiologist  Patient Location:  OR  Indication: continuous blood pressure monitoring    Site Identification: real time ultrasound guided and image stored and retrievable    Preanesthetic Checklist: 2 patient identifiers, IV checked, risks and benefits discussed, monitors and equipment checked, pre-op evaluation, timeout performed, anesthesia consent and sterile technique used    Procedure Details    Catheter Size:  20 G  Catheter Length:  1 and 3/4 inch  Catheter Type:  Angiocath  Seldinger Technique?: No    Laterality:  Left  Site:  Radial artery  Site Prep: alcohol swabs    Line Secured:  Tape and Tegaderm    Assessment    Events: patient tolerated procedure well with no complications      Medications      Additional Comments

## 2024-05-17 NOTE — PROGRESS NOTES
NURSING ADMISSION NOTE      Patient admitted via Cart  Oriented to room.  Safety precautions initiated.  Bed in low position.  Call light in reach.  Received patient from recovery room , alert and orient x 4 , on room air , vitals stable , abdominal incision  lap x 4 , skin glue on , ileal conduit  draining yellow urine . Colostomy intact , no output noted , clear liquid diet , family at bedside , plan of care discussed , answered all questions . Verbalized understanding . Will continue to monitor    1800 patient's own medicine took to pharmacy for verified by pharmacy , will send back with moxi .

## 2024-05-17 NOTE — OPERATIVE REPORT
[facility]    PATIENT'S NAME: MIROSLAVA KWOK   ATTENDING PHYSICIAN: Yael Cadena MD   OPERATING PHYSICIAN: Svetlana Alejandre M.D.   PATIENT ACCOUNT#:   [id number]    LOCATION:  [facility] OR [facility]  MEDICAL RECORD #:   [id number]       YOB: 1950  ADMISSION DATE:       05/17/2024      OPERATION DATE:  05/17/2024    OPERATIVE REPORT      PREOPERATIVE DIAGNOSIS:  Urinary retention and pelvic pain.  POSTOPERATIVE DIAGNOSIS:  Urinary retention and pelvic pain.  PROCEDURE:  Removal of right internal jugular Port-A-Cath.    ANESTHESIA:  General.    ESTIMATED BLOOD LOSS:  1 mL.    SPECIMEN:  None.      COMPLICATIONS:  None.    INDICATIONS:  The patient is a 73-year-old female undergoing a robotic procedure by Dr. Cadena.  She had a right internal jugular Port-A-Cath inserted by Dr. Elia York.  She is no longer needing the Port-A-Cath and requesting removal, so the patient signed the informed consent for removal.  Patient was given a preoperative dose of IV antibiotics.    OPERATIVE TECHNIQUE:  The patient was brought to the operating room, was placed in the supine position on the operating table.  After IV sedation and endotracheal intubation, the patient's upper chest area was prepped and draped into a sterile field.  Then, local anesthetic was injected and the previous incision was reopened using a 15 blade.  There were no stitches around the port.  Before removal, a figure-of-eight stitch was placed at the entrance of the catheter.  After that, the port was removed without any resistance and the stitch was secured.  There was no evidence of any backbleeding.  Then, 3-0 Vicryl was used to close the incision, placing a subcuticular stitch, and then it was covered with Dermabond.  The patient tolerated the procedure well.  At this point, Dr. Cadena will be starting his portion which will be dictated separately.  At the end the case, needle, instrument, and sponge counts were all  correct.     Dictated By Svetlana Alejandre M.D.  d: 05/17/2024 08:24:46  t: 05/17/2024 10:11:53  Job [id number]  TL/

## 2024-05-17 NOTE — ANESTHESIA PREPROCEDURE EVALUATION
PRE-OP EVALUATION    Patient Name: Frankie Lemus    Admit Diagnosis: URINARY RETENTION; PELVIC PAIN    Pre-op Diagnosis: URINARY RETENTION; PELVIC PAIN    XI ROBOTIC ASSISTED LAPAROSCOPIC SIMPLE CYSTECTOMY, ILEAL CONDUIT DIVERSION (MARIO ) REMOVAL OF POWER PORT (HIRO)    Anesthesia Procedure: XI ROBOTIC ASSISTED LAPAROSCOPIC SIMPLE CYSTECTOMY, ILEAL CONDUIT DIVERSION (MARIO ) REMOVAL OF POWER PORT (HIRO) (Abdomen)  REMOVAL OF POWER PORT (Chest)    Surgeons and Role:  Panel 1:     * Yael Cadena MD - Primary  Panel 2:     * Svetlana Alejandre MD - Primary    Pre-op vitals reviewed.  Temp: 98.7 °F (37.1 °C)  Pulse: 86  Resp: 16  BP: 116/70  SpO2: 98 %  Body mass index is 18.23 kg/m².    Current medications reviewed.  Hospital Medications:   acetaminophen (Tylenol Extra Strength) tab 1,000 mg  1,000 mg Oral Once    lactated ringers infusion   Intravenous Continuous    [COMPLETED] heparin (Porcine) 5000 UNIT/ML injection 5,000 Units  5,000 Units Subcutaneous Once    ceFAZolin (Ancef) 2g in 10mL IV syringe premix  2 g Intravenous Once       Outpatient Medications:     Medications Prior to Admission   Medication Sig Dispense Refill Last Dose    NON FORMULARY See Admin Instructions. THC 5mg 1:1 CBD uses 1 square once at night.   5/15/2024    Trospium Chloride 20 MG Oral Tab Take 1 tablet (20 mg total) by mouth 2 (two) times daily.   5/16/2024 at 1900    Multiple Vitamin (MULTIVITAMIN OR) Take by mouth See Admin Instructions. Liquid squirt bottle called Quick Silver once a day.   5/10/2024    diazePAM 5 MG Oral Tab Take 1 tablet (5 mg total) by mouth daily.   5/16/2024 at 1900    baclofen 10 MG Oral Tab 1 tablet (10 mg total) daily. PLACE VAGINALLY AS NEEDED FOR BLADDER/VAGINAL PAIN UP TO TWO TIMES DAILY. NOT ORAL. STAY RECUMBENT FOR 20 MINUTES TO ENSURE ABSORPTION OF TABLET   5/16/2024 at 1900    NON FORMULARY Dora  collagen wound healer drink daily.  Powder.   5/16/2024 at 0800    Levothyroxine Sodium  (TIROSINT-SOL) 125 MCG/ML Oral Solution Take 125 mcg by mouth daily. TAKE CONtENTS OF ONE AMPULE BY MOUTH DAILY ON AN EMPTY STOMACH   5/16/2024 at 1900    NON FORMULARY COMPOUND OF LEVOTHYROXINE 35MCG AND C-T3   5/16/2024 at 1900    Syringe, Disposable, (SHIRA SYRINGE) 70 ML Does not apply Misc Use as directed 6 each 11     Cholecalciferol (VITAMIN D3) 5000 UNIT Oral Cap Take 1 capsule (5,000 Units total) by mouth daily.   5/10/2024    NALTREXONE HCL OR Take 9 mg by mouth daily. From Compound Anamika Schroeder.   5/14/2024       Allergies: Fentanyl and Toradol      Anesthesia Evaluation  Patient Active Problem List   Diagnosis    IC (interstitial cystitis)    Pelvic pain in female    Dizziness    Bowel dysfunction        Past Medical History:    Anesthesia complication    gets hyped up after surgeries/anesthesiologist    ANXIETY    Bowel dysfunction    CANCER    thyroid    Cancer (HCC)    Crohn's colitis (HCC)    seeing Jose    DEPRESSION    Disorder of thyroid    Exposure to medical diagnostic radiation    last dose 4-2023    HYPOTHYROIDISM    Interstitial cystitis    MENOPAUSE    Other and unspecified peripheral vertigo(386.19)    Personal history of antineoplastic chemotherapy    last chemo 04-    Rectal cancer (HCC)    Visual impairment    readers        Past Surgical History:   Procedure Laterality Date    Appendectomy      Colonoscopy      Colonoscopy  07/2018    MAC: tortuous colon, melanosis, 3 mm cecal polyp (tubular adenoma), int hemorrhoids, repeat 2023    Colonoscopy N/A 07/27/2018    Procedure: COLONOSCOPY, POSSIBLE BIOPSY, POSSIBLE POLYPECTOMY 65141;  Surgeon: Helio De La Torre MD;  Location: [facility]    Cystourethroscopy  07/11/2011    Cysto, hydrodistention, rescue solution-      Cystourethroscopy  07/09/2012    cysto, kenalog injection - Dr. Lopez    Cystourethroscopy,fulgur <.5cm lesn  07/11/2011    Performed by MIROSLAVA LOPEZ at [facility]     Cystourethroscopy,ureter catheter  07/11/2011    Performed by MIROSLAVA DAILY at [facility], [facility]    Hernia surgery      Hysterectomy  03/18/2009    fibroids ([place])    Knee surgery Left     acl reconstruction-left knee    Knee surgery      arthoscopic x 3 knee    Other surgical history  11/25/2009    revision hysterectomy incision ([place])    Other surgical history  08/02/2010    Interstim trial Dr. Salamanca    Other surgical history  01/2012    nerve blocks with pain management team - no lasting effect    Thyroid ablation      thyroid    X-ray retrograde pyelogram  07/11/2011    Performed by MIROSLAVA DAILY at [facility], [facility]     Social History     Socioeconomic History    Marital status:    Tobacco Use    Smoking status: Never    Smokeless tobacco: Never   Vaping Use    Vaping status: Never Used   Substance and Sexual Activity    Alcohol use: No    Drug use: Yes     Comment: medicinal CHOCOLATE 1:1 THC/CBD NIGHTLY     History   Drug Use     Comment: medicinal CHOCOLATE 1:1 THC/CBD NIGHTLY     Available pre-op labs reviewed.               Airway      Mallampati: II  Mouth opening: >3 FB  TM distance: 4 - 6 cm  Neck ROM: full Cardiovascular      Rhythm: regular  Rate: normal     Dental    Dentition appears grossly intact         Pulmonary    Pulmonary exam normal.  Breath sounds clear to auscultation bilaterally.               Other findings              ASA: 3   Plan: general  NPO status verified and     Post-procedure pain management plan discussed with surgeon and patient.    Comment:  I explained intrinsic risks of general anesthesia, including nausea, dental damage, sore throat, mouth injury,and hoarseness from airway management.  All questions were answered and understanding was demonstrated of risks.  Informed permission was obtained to proceed as documented in the signed consent form.      Plan/risks discussed with: patient  Use of blood product(s) discussed with: patient    Consented to  blood products.          Present on Admission:  **None**

## 2024-05-17 NOTE — BRIEF OP NOTE
Pre-Operative Diagnosis: URINARY RETENTION; PELVIC PAIN     Post-Operative Diagnosis: URINARY RETENTION; PELVIC PAIN      Procedure Performed:   XI ROBOTIC ASSISTED LAPAROSCOPIC SIMPLE CYSTECTOMY, ILEAL CONDUIT DIVERSION (MARIO ) REMOVAL OF POWER PORT (HIRO)    Surgeons and Role:  Panel 1:     * Yael Cadena MD - Primary  Panel 2:     * Svetlana Alejandre MD - Primary    Assistant(s):  Surgical Assistant.: Jessie Masterson, SONYA     Surgical Findings: unremarkable right internal jugular portacatheter      Specimen: none     Estimated Blood Loss: No data recorded    Dictation Number:  na    Svetlana Alejandre MD  5/17/2024  8:22 AM

## 2024-05-17 NOTE — ANESTHESIA PROCEDURE NOTES
Peripheral IV  Date/Time: 5/17/2024 7:52 AM  Inserted by: Casey Broussard MD    Placement  Needle size: 18 G  Laterality: right  Location: antecubital  Site prep: alcohol  Technique: ultrasound guided  Attempts: 1

## 2024-05-17 NOTE — H&P
Pre-op Diagnosis: URINARY RETENTION; PELVIC PAIN    The above referenced H&P by Dr Tirado from 4/23/24 was reviewed by Yael Cadena MD on 5/17/2024, the patient was examined and no significant changes have occurred in the patient's condition since the H&P was performed.  I discussed with the patient and/or legal representative the potential benefits, risks and side effects of this procedure; the likelihood of the patient achieving goals; and potential problems that might occur during recuperation.  I discussed reasonable alternatives to the procedure, including risks, benefits and side effects related to the alternatives and risks related to not receiving this procedure.  We will proceed with procedure as planned.    CLAUDETTE Cadena MD   5/17/24

## 2024-05-18 LAB
ANION GAP SERPL CALC-SCNC: 4 MMOL/L (ref 0–18)
BASOPHILS # BLD AUTO: 0.07 X10(3) UL (ref 0–0.2)
BASOPHILS NFR BLD AUTO: 0.8 %
BUN BLD-MCNC: 10 MG/DL (ref 9–23)
CALCIUM BLD-MCNC: 8.3 MG/DL (ref 8.5–10.1)
CHLORIDE SERPL-SCNC: 111 MMOL/L (ref 98–112)
CO2 SERPL-SCNC: 24 MMOL/L (ref 21–32)
CREAT BLD-MCNC: 0.51 MG/DL
EGFRCR SERPLBLD CKD-EPI 2021: 99 ML/MIN/1.73M2 (ref 60–?)
EOSINOPHIL # BLD AUTO: 0.03 X10(3) UL (ref 0–0.7)
EOSINOPHIL NFR BLD AUTO: 0.3 %
ERYTHROCYTE [DISTWIDTH] IN BLOOD BY AUTOMATED COUNT: 13.3 %
GLUCOSE BLD-MCNC: 123 MG/DL (ref 70–99)
HCT VFR BLD AUTO: 37 %
HGB BLD-MCNC: 12.6 G/DL
IMM GRANULOCYTES # BLD AUTO: 0.02 X10(3) UL (ref 0–1)
IMM GRANULOCYTES NFR BLD: 0.2 %
LYMPHOCYTES # BLD AUTO: 0.37 X10(3) UL (ref 1–4)
LYMPHOCYTES NFR BLD AUTO: 4.1 %
MCH RBC QN AUTO: 29.4 PG (ref 26–34)
MCHC RBC AUTO-ENTMCNC: 34.1 G/DL (ref 31–37)
MCV RBC AUTO: 86.4 FL
MONOCYTES # BLD AUTO: 0.48 X10(3) UL (ref 0.1–1)
MONOCYTES NFR BLD AUTO: 5.4 %
NEUTROPHILS # BLD AUTO: 7.99 X10 (3) UL (ref 1.5–7.7)
NEUTROPHILS # BLD AUTO: 7.99 X10(3) UL (ref 1.5–7.7)
NEUTROPHILS NFR BLD AUTO: 89.2 %
OSMOLALITY SERPL CALC.SUM OF ELEC: 288 MOSM/KG (ref 275–295)
PLATELET # BLD AUTO: 209 10(3)UL (ref 150–450)
POTASSIUM SERPL-SCNC: 3.8 MMOL/L (ref 3.5–5.1)
RBC # BLD AUTO: 4.28 X10(6)UL
SODIUM SERPL-SCNC: 139 MMOL/L (ref 136–145)
WBC # BLD AUTO: 9 X10(3) UL (ref 4–11)

## 2024-05-18 PROCEDURE — 80048 BASIC METABOLIC PNL TOTAL CA: CPT | Performed by: UROLOGY

## 2024-05-18 PROCEDURE — 85025 COMPLETE CBC W/AUTO DIFF WBC: CPT | Performed by: UROLOGY

## 2024-05-18 NOTE — PROGRESS NOTES
[facility] Urology Progress Note    Frankie Lemus Patient Status:  Inpatient    1950 MRN [id number]   Location [facility] 3NW-A Attending Yael Cadena MD   Hosp Day # 1 PCP PEÑA EVANGELISTA     Subjective:  Frankie Lemus is a(n) 73 year old female.    Hospital course to date: POD#1 s/p RAL simple cystectomy and ileal conduit  Current  complaints: She is feeling weak but well. Pain controlled. No flatus yet. Belching but no nausea or emesis. She has ambulated.    Objective:  Temp (24hrs), Av.5 °F (36.9 °C), Min:98.4 °F (36.9 °C), Max:98.6 °F (37 °C)    /71 (BP Location: Left arm)   Pulse 67   Temp 98.4 °F (36.9 °C) (Oral)   Resp 20   Ht 5' 9\" (1.753 m)   Wt 123 lb 7.3 oz (56 kg)   SpO2 95%   BMI 18.23 kg/m²     Intake/Output Summary (Last 24 hours) at 2024 1343  Last data filed at 2024 1214  Gross per 24 hour   Intake 2431 ml   Output 3610 ml   Net -1179 ml     General: Calm, NAD  HEENT: NCAT, no scleral icterus  CV: RR  Pulmonary: breathing unlabored, symmetric bilaterally, no audible wheezes  Abdomen: soft, mild ttp parveen-incisionally incisions c/d/i, ND, no masses, no rebound, no guarding, no rigidity  Stoma pink patent producing clear urine with mild mucous  LISSETT drain with serosange        Laboratory Data:  Lab Results   Component Value Date    WBC 9.0 2024    HGB 12.6 2024    HCT 37.0 2024    .0 2024    CREATSERUM 0.51 2024    BUN 10 2024     2024    K 3.8 2024     2024    CO2 24.0 2024     2024    CA 8.3 2024         Hospital Problem List:  Patient Active Problem List   Diagnosis    IC (interstitial cystitis)    Pelvic pain in female    Dizziness    Bowel dysfunction       IMPRESSION    1. POD#1 s/p RAL simple cystectomy and ileal conduit  -Progressing well    PLAN  1. Continue to ambulate  2. Continue clears for now  3. Will follow    The  above impression and plan were discussed in detail with the patient and her  who verbalized understanding and all questions were answered.    Andre Lyons D.O.  [facility]  Department of Urology      5/18/2024  1:42 PM

## 2024-05-18 NOTE — OPERATIVE REPORT
[facility] Urology                  Operative Note     Frankie Lemus Patient Status:  Inpatient    1950 MRN [id number]   [facility] 3NW-A Attending Yael Cadena MD   Hosp Day # 0 PCP PEÑA EVANGELISTA       SURGEON: Yael Cadena M.D.    ASST:  Jessie Masterson    PREOPERATIVE DIAGNOSIS: urinary retention, chronic pelvic pain   POSTOPERATIVE DIAGNOSIS: urinary retention, chronic pelvic pain     OPERATION:  Robotic-assisted laparoscopic simple cystectomy and intracorporeal urinary intestinal diversion (ileal conduit)     ESTIMATED BLOOD LOSS:  50 mL.  DRAINS:  Pelvic Rommel-Richmond x 1, Bilateral 6 Fr feeding tubes as ureteral stents  COMPLICATIONS: None  ANESTHESIA:  General.    OPERATIVE INDICATIONS:  The patient was referred for evaluation and management of urinary retention and chronic pelvic pain.  She has a history of  rectal cancer and is s/p colostomy (previous EBRT to pelvis in 2023).  She has had IC for last 25 years. Patient is unable to tolerate any urine in her bladder and was performing CIC 15-20 times per day.  Throughout radiation she has developed severe swelling and pain in her vagina secondary to mass encroaching the vagina and has been unable to perform CIC secondary to pain.  The natural history of this disease was explained to the patient at length, as well as the treatment options.  She elected to undergo a robotic-assisted laparoscopic cystectomy and intestinal urinary diversion after thorough discussion of the rationale, risks, benefits, alternatives, and personnel.  The patient is admitted for surgery at this time.    OPERATIVE PROCEDURE: After informed consent was obtained, the patient was taken to the operating room. A time-out was performed where the patient and procedure were identified in the presence of Nursing, Anesthesia, and surgical staff. After the placement of lines and monitors, general anesthesia was induced. Prophylactic  antibiotics were administered. Sequential compressive devices were placed on both lower extremities. The patient was placed in the lithotomy position. She was prepped and draped in a standard sterile fashion.    Through a supraumbilical incision, a Veress needle was inserted into the peritoneal cavity and a pneumoperitoneum was established. Through this incision, a 8-mm trocar was placed and the laparoscope was introduced. Inspection of the abdominal cavity after trocar placement revealed no evidence of any injury. Three 8-mm ports and a 12-mm assistant port were then placed in the abdomen in the standard configuration under direct vision. The patient was then placed in 16 degrees Trendelenburg position and the robot was docked.    The ureters were identified as they coursed over the common iliac vessels and each ureter was moderately dilated. They were dissected into the pelvis and any visible vessels were controlled with the bipolar cautery before division. At the ureterovesical junction, Hem-o-lock clips were placed proximally and distally and the ureters were divided.  The peritoneum was divided over the posterior vaginal fornix and the vaginal vault and lateral vaginal walls were identified by dividing the adjacent pedicles with the Vessel Sealer. The peritoneum was then extended superiorly and laterally. The lateral pedicles were identified and secured proximally with the Vessel Sealer. The vaginal walls were spared and a place between the bladder and anterior vagina was developed to the level of the bladder neck.  The peritoneum lateral to the medial umbilical ligaments was incised bilaterally and the space of Retzius was developed through blunt and sharp dissection. The urachus was controlled with the bipolar cautery and divided. The fatty tissue was dissected off the symphysis pubis and the anterior surface of the bladder and endopelvic fascia. The endopelvic fascia was incised bilaterally. The adherent  fibers of the levator ani muscles were dissected off the lateral surface of the vagina and urethra. The urethropelvic ligaments were incised at their insertion into symphysis pubis.  A backbleeding stitch of 3-0 V-lock was then placed distally through the dorsal venous complex.  The dorsal venous complex was divided with monopolar cautery down to level of the urethra. The urethra was then divided with the cold scissors sharply. This freed the specimen completely and was placed in an EndoCatch bag for removal at the end of the procedure. The pelvis was then thoroughly irrigated with saline and hemostasis within the pelvis was confirmed.  The left ureter was then brought into the right iliac fossa beneath the mesentery of the sigmoid colon.       ILEOCONDUIT  The mesentery between the ileocolic and terminal branches of the superior mesenteric artery was divided and this was divided with the use of Vessel Sealer device.  The small intestine appeared to have chronic radiation induced changes.  The bowel was divided with the 60 mm stapling device.  At the proximal limit of the 15 cm ileal limb, the the mesentery was divided between the vascular arcades and the bowel was divided with a stapler.  We then performed a small bowel anastomosis in a side-to-side configuration with the combination of manual suturing using 3-0 V-lock and stapling device.  The  crotch of the anastomosis was reinforced with a 3-0 Silk stitch.  Upon completion, there was noted to be good vascularity and patency of the anastomosis.  The staple line of the stomal end of the conduit was then excised. Ureterointestinal anastomosis was performed into the proximal end of the ileal diversion.  The was performed using interrupted sutures of 4-0 vicryl.  Before completion of the anastomosis, 6 Fr feeding tubes (as ureteral stents) were then advanced into the renal pelvis bilaterally and fixed to the mucosa of the diversion using 3-0 chromic stitches.    At  the previously marked stoma site, a disc of skin was excised and the subcutaneous fat was divided down to the rectus fascia where a stellate incision was made in the anterior and posterior sheath.  The conduit and stents were brought out through the stoma site and the stoma was sutured to the skin with interrupted sutures of 2-0 Vicryl (to create the hugo bud configuration), followed by 3-0 vicryl sutures between the soma mucosa edge and skin.     A Rommel-Richmond drain was then placed in the pelvis through one of the existing left 8 mm ports and affixed to the skin with drain stitches.  The midline incision was extended and specimen within the Endocatch bag removed.  We then closed the fascial incision using a interrupted sutures of 0-Vicryl.  The skin and subcutaneous tissues were thoroughly irrigated and the skin was reapproximated using a subcuticular stitch of 4-0 Monocryl.  A Dermabond was applied to the incision.      The patient was then lightened from anesthesia and transferred to recovery room in stable condition having tolerated procedure well without incident or complication.     I was present throughout the entire procedure and performed all critical steps.    DISPOSITION: To recovery room in stable condition    Yael Cadena MD  [facility] Urology  5/17/2024

## 2024-05-18 NOTE — PROGRESS NOTES
[facility]   part of [facility] Hospitalist Progress Note     Frankie Lemus Patient Status:  Inpatient    1950 MRN [id number]   Location [facility] 3NW-A Attending Yael Cadena MD   Hosp Day # 1 PCP PEÑA EVANGELISTA     Follow Up:  The encounter diagnosis was Pelvic pain in female.    Subjective:     Patient seen and examined.   She c/o post op incisional pain.  Tolerating CLD.  No fevers.     Objective:    Review of Systems:   10 point ROS completed and was negative, except for pertinent positive and negatives stated in subjective.    Vital signs:  Temp:  [98.1 °F (36.7 °C)-98.6 °F (37 °C)] 98.6 °F (37 °C)  Pulse:  [78-97] 78  Resp:  [10-20] 18  BP: (102-123)/(49-72) 117/55  SpO2:  [91 %-99 %] 97 %    Physical Exam:    Gen: No acute distress, alert and oriented x3, no focal neurologic deficits  HEENT:  EOMI, PERRLA, OP clear, MMM  Pulm: Lungs clear bilaterally, normal respiratory effort  CV: Heart with regular rate and rhythm, no murmur.  Normal PMI.    Abd: Lap incisions clean.  +urostomy. +colostomy   MSK: Full range of motion in extremities, no clubbing, no cyanosis  Skin: no rashes or lesions  Neuro:  Grossly intact, no sensory deficits       Diagnostic Data:    Labs:  Recent Labs   Lab 24  0700   WBC 9.0   HGB 12.6   MCV 86.4   .0       Recent Labs   Lab 24  0700   *   BUN 10   CREATSERUM 0.51*   CA 8.3*      K 3.8      CO2 24.0       Estimated Creatinine Clearance: 88.1 mL/min (A) (based on SCr of 0.51 mg/dL (L)).    No results for input(s): \"PTP\", \"INR\" in the last 168 hours.         COVID-19 Lab Results    COVID-19  No results found for: \"COVID19\"    Pro-Calcitonin  No results for input(s): \"PCT\" in the last 168 hours.    Cardiac  No results for input(s): \"TROP\", \"PBNP\" in the last 168 hours.    Creatinine Kinase  No results for input(s): \"CK\" in the last 168 hours.    Inflammatory Markers  No results for  input(s): \"CRP\", \"ULI\", \"LDH\", \"DDIMER\" in the last 168 hours.    Imaging: Imaging data reviewed in Epic.    Medications:    baclofen  10 mg Oral BID    diazePAM  5 mg Oral Daily    enoxaparin  40 mg Subcutaneous Daily    sennosides  17.2 mg Oral Nightly    docusate sodium  100 mg Oral BID    acetaminophen  650 mg Oral Q4H While awake    famotidine  20 mg Oral BID    Or    famotidine  20 mg Intravenous BID    Levothyroxine Sodium  125 mcg Oral Nightly       Assessment & Plan:      73 yr old female with PMH sig for interstitial cystitis, depression, anxiety, and hypothyroidism who presents s/p robotic assisted lap cystectomy with ileal conduit diversion and removal of port.       # Chronic urinary retention, interstitial cystitis   # Urostomy status  # Colostomy status  S/p robotic assisted lap cystectomy with ileal conduit diversion and removal of port  Post op care per  and gen surg  Encourage IS use     # Hypothyroidism   Cont levothyroxine      # Generalized anxiety d/o  Cont diazepam PRN    Plan of care: inpt care.    Plan of care discussed with patient or family at bedside.    Edmond Chacon DO    Supplementary Documentation:     Quality:  DVT Prophylaxis: lov subcutaneous   CODE status: FULL  [place]: no  Central line: no  If COVID testing is negative, may discontinue isolation: yes     Estimated date of discharge: TBD  Discharge is dependent on: clinical course   At this point Ms. Lemus is expected to be discharge to: home     Plan of care discussed with pt

## 2024-05-18 NOTE — PROGRESS NOTES
A&O x4, slightly forgetful. Denies any CP, MARKEL, or calf pain at present. Lungs clear and diminished in bases bilaterally. Abdomen soft, tender, nondistended. Bowel sounds hypoactive but present. Pt denies any nausea this am. Pt reports low appetite - encouraged smaller, more frequent meals. Ostomy of colostomy is moist, pink - no flatus or stool noted in appliance. Ileal conduit ostomy is moist and pink - stents in place, putting out yellow urine to vargas bag. Laps x5 c/d/I with skin glue. LISSETT drain x1 putting out serosanguineous drainage. Pt reports pain 8/10, pain medication given. Pt encouraged to sit up in chair, use IS, and ambulate frequently today. POC discussed and tp verbalizes understanding. Pt resting in bed, call light within reach, safety precautions in place.

## 2024-05-18 NOTE — PROGRESS NOTES
Alert and oriented x4. On room air. Continuous pulse ox. Vital signs stable. Left eye sclera is red.  GI: Abdomen soft, nondistended. Zofran given for nausea. Colostomy-no output.  : ileal conduit to vargas bag. Output is yellow, clear, no odor  Pain controlled with PRN pain medications. Tylenol and Oxycodone.  Up ad connie  Drains: LISSETT x1 on left, Colostomy, ileal conduit  Incisions: lap sites x4 on abdomen. Upper right sternum- removed power port.  Diet: clear liquid diet  IVF running per order. LR @100  All appropriate safety measures in place. All questions and concerns addressed.    Patient supplied Levothyroxine from home. In patient bin

## 2024-05-19 LAB
ANION GAP SERPL CALC-SCNC: 6 MMOL/L (ref 0–18)
BASOPHILS # BLD AUTO: 0.03 X10(3) UL (ref 0–0.2)
BASOPHILS NFR BLD AUTO: 0.2 %
BUN BLD-MCNC: 12 MG/DL (ref 9–23)
CALCIUM BLD-MCNC: 8.8 MG/DL (ref 8.5–10.1)
CHLORIDE SERPL-SCNC: 108 MMOL/L (ref 98–112)
CO2 SERPL-SCNC: 22 MMOL/L (ref 21–32)
CREAT BLD-MCNC: 0.59 MG/DL
EGFRCR SERPLBLD CKD-EPI 2021: 96 ML/MIN/1.73M2 (ref 60–?)
EOSINOPHIL # BLD AUTO: 0.04 X10(3) UL (ref 0–0.7)
EOSINOPHIL NFR BLD AUTO: 0.3 %
ERYTHROCYTE [DISTWIDTH] IN BLOOD BY AUTOMATED COUNT: 13 %
GLUCOSE BLD-MCNC: 154 MG/DL (ref 70–99)
HCT VFR BLD AUTO: 45.9 %
HGB BLD-MCNC: 15 G/DL
IMM GRANULOCYTES # BLD AUTO: 0.04 X10(3) UL (ref 0–1)
IMM GRANULOCYTES NFR BLD: 0.3 %
LYMPHOCYTES # BLD AUTO: 0.39 X10(3) UL (ref 1–4)
LYMPHOCYTES NFR BLD AUTO: 3.1 %
MCH RBC QN AUTO: 29.1 PG (ref 26–34)
MCHC RBC AUTO-ENTMCNC: 32.7 G/DL (ref 31–37)
MCV RBC AUTO: 89 FL
MONOCYTES # BLD AUTO: 0.44 X10(3) UL (ref 0.1–1)
MONOCYTES NFR BLD AUTO: 3.5 %
NEUTROPHILS # BLD AUTO: 11.56 X10 (3) UL (ref 1.5–7.7)
NEUTROPHILS # BLD AUTO: 11.56 X10(3) UL (ref 1.5–7.7)
NEUTROPHILS NFR BLD AUTO: 92.6 %
OSMOLALITY SERPL CALC.SUM OF ELEC: 285 MOSM/KG (ref 275–295)
PLATELET # BLD AUTO: 279 10(3)UL (ref 150–450)
POTASSIUM SERPL-SCNC: 3.9 MMOL/L (ref 3.5–5.1)
RBC # BLD AUTO: 5.16 X10(6)UL
SODIUM SERPL-SCNC: 136 MMOL/L (ref 136–145)
WBC # BLD AUTO: 12.5 X10(3) UL (ref 4–11)

## 2024-05-19 PROCEDURE — 85025 COMPLETE CBC W/AUTO DIFF WBC: CPT | Performed by: UROLOGY

## 2024-05-19 PROCEDURE — 80048 BASIC METABOLIC PNL TOTAL CA: CPT | Performed by: UROLOGY

## 2024-05-19 NOTE — PROGRESS NOTES
[facility] Urology Progress Note    Frankie Lemus Patient Status:  Inpatient    1950 MRN [id number]   Location [facility] 3NW-A Attending Yael Cadena MD   Hosp Day # 2 PCP PEÑA EVANGELISTA     Subjective:  Frankie Lemus is a(n) 73 year old female.    Hospital course to date: POD#2 s/p RAL simple cystectomy and ileal conduit  Current  complaints: No flatus yet. She had one small non bilious emesis. No current nausea. Ambulating.    Objective:  Temp (24hrs), Av.2 °F (36.8 °C), Min:98 °F (36.7 °C), Max:98.5 °F (36.9 °C)    /64 (BP Location: Left arm)   Pulse 75   Temp 98.2 °F (36.8 °C) (Oral)   Resp 16   Ht 5' 9\" (1.753 m)   Wt 123 lb 7.3 oz (56 kg)   SpO2 95%   BMI 18.23 kg/m²     Intake/Output Summary (Last 24 hours) at 2024 1448  Last data filed at 2024 1121  Gross per 24 hour   Intake 860 ml   Output 2010 ml   Net -1150 ml     General: Calm, NAD  HEENT: NCAT, no scleral icterus  CV: RR  Pulmonary: breathing unlabored, symmetric bilaterally, no audible wheezes  Abdomen: soft, mild ttp parveen-incisionally incisions c/d/i, mild distention, no masses, no rebound, no guarding, no rigidity  Stoma pink patent producing clear urine with mild mucous  LISSETT drain with serosange        Laboratory Data:  Lab Results   Component Value Date    WBC 12.5 2024    HGB 15.0 2024    HCT 45.9 2024    .0 2024    CREATSERUM 0.59 2024    BUN 12 2024     2024    K 3.9 2024     2024    CO2 22.0 2024     2024    CA 8.8 2024         Hospital Problem List:  Patient Active Problem List   Diagnosis    IC (interstitial cystitis)    Pelvic pain in female    Dizziness    Bowel dysfunction       IMPRESSION    1. POD#2 s/p RAL simple cystectomy and ileal conduit  -Mild ileus    PLAN  1. Continue to ambulate  2. Continue clears for now - avoid carbonation  3. Will follow    The above  impression and plan were discussed in detail with the patient and her  who verbalized understanding and all questions were answered.    Andre Lyons D.O.  [facility]  Department of Urology

## 2024-05-19 NOTE — PLAN OF CARE
Problem: Patient/Family Goals  Goal: Patient/Family Long Term Goal  Description: Patient's Long Term Goal: discharge home    Interventions:  - oral pain meds  -tolerating diet  -antibiotics  - See additional Care Plan goals for specific interventions  Outcome: Progressing  Goal: Patient/Family Short Term Goal  Description: Patient's Short Term Goal: comfort    Interventions:   - PRN pain meds  -repositioning  - See additional Care Plan goals for specific interventions  Outcome: Progressing     Problem: PAIN - ADULT  Goal: Verbalizes/displays adequate comfort level or patient's stated pain goal  Description: INTERVENTIONS:  - Encourage pt to monitor pain and request assistance  - Assess pain using appropriate pain scale  - Administer analgesics based on type and severity of pain and evaluate response  - Implement non-pharmacological measures as appropriate and evaluate response  - Consider cultural and social influences on pain and pain management  - Manage/alleviate anxiety  - Utilize distraction and/or relaxation techniques  - Monitor for opioid side effects  - Notify MD/LIP if interventions unsuccessful or patient reports new pain  - Anticipate increased pain with activity and pre-medicate as appropriate  Outcome: Progressing     Problem: RISK FOR INFECTION - ADULT  Goal: Absence of fever/infection during anticipated neutropenic period  Description: INTERVENTIONS  - Monitor WBC  - Administer growth factors as ordered  - Implement neutropenic guidelines  Outcome: Progressing     Problem: SAFETY ADULT - FALL  Goal: Free from fall injury  Description: INTERVENTIONS:  - Assess pt frequently for physical needs  - Identify cognitive and physical deficits and behaviors that affect risk of falls.  - [place] fall precautions as indicated by assessment.  - Educate pt/family on patient safety including physical limitations  - Instruct pt to call for assistance with activity based on assessment  - Modify environment to  reduce risk of injury  - Provide assistive devices as appropriate  - Consider OT/PT consult to assist with strengthening/mobility  - Encourage toileting schedule  Outcome: Progressing     Problem: DISCHARGE PLANNING  Goal: Discharge to home or other facility with appropriate resources  Description: INTERVENTIONS:  - Identify barriers to discharge w/pt and caregiver  - Include patient/family/discharge partner in discharge planning  - Arrange for needed discharge resources and transportation as appropriate  - Identify discharge learning needs (meds, wound care, etc)  - Arrange for interpreters to assist at discharge as needed  - Consider post-discharge preferences of patient/family/discharge partner  - Complete POLST form as appropriate  - Assess patient's ability to be responsible for managing their own health  - Refer to Case Management Department for coordinating discharge planning if the patient needs post-hospital services based on physician/LIP order or complex needs related to functional status, cognitive ability or social support system  Outcome: Progressing

## 2024-05-19 NOTE — PROGRESS NOTES
Alert and oriented x4. Forgetful at times. On room air. Continuous pulse ox. Vital signs stable.  GI: Abdomen soft, nondistended. Bowel sounds hypoactive. No gas or BM via colostomy. Blister under colostomy adhesive covered with gauze and tegaderm.   : Ileal conduit in place draining to vargas. Urine yellow, clear, no odor. Bag changed today d/t leaking.   Pain controlled with PRN pain medications  Up with standby assist.  Drains: LISSETT on left. Dressing changed d/t leaking.  Incisions: 5 lap sites with skin glue on abdomen. Old port site closed with skin glue on patient's right chest below clavicle.  Diet: Tolerating clear liquid diet  Saline locked.  All appropriate safety measures in place. All questions and concerns addressed

## 2024-05-19 NOTE — PROGRESS NOTES
Alert and orient x 4 , on room air , vitals stable , c/o nausea , very sleepy , ileal conduit draining clear yellow urine , incisions dry and intact , yesica draining sero sanguinous drainage . Colostomy intact . No output noted , encouraged her to use IS  and ambulates more . Plan of care discussed , answered all questions . Verbalized understanding , will continue to monitor

## 2024-05-19 NOTE — PROGRESS NOTES
[facility]   part of [facility] Hospitalist Progress Note     Frankie Lemus Patient Status:  Inpatient    1950 MRN [id number]   Location [facility] 3NW-A Attending Yael Cadena MD   Hosp Day # 2 PCP PEÑA EVANGELISTA     Follow Up:  The encounter diagnosis was Pelvic pain in female.    Subjective:     Patient seen and examined.   She c/o post op abd bloating with pain.  Tolerating CLD, but no ostomy output.  No fevers.     Objective:    Review of Systems:   10 point ROS completed and was negative, except for pertinent positive and negatives stated in subjective.    Vital signs:  Temp:  [98 °F (36.7 °C)-98.5 °F (36.9 °C)] 98 °F (36.7 °C)  Pulse:  [67-86] 86  Resp:  [18-20] 18  BP: (116-138)/(70-77) 138/77  SpO2:  [94 %-96 %] 94 %    Physical Exam:    Gen: No acute distress, alert and oriented x3, no focal neurologic deficits  HEENT:  EOMI, PERRLA, OP clear, MMM  Pulm: Lungs clear bilaterally, normal respiratory effort  CV: Heart with regular rate and rhythm, no murmur.  Normal PMI.    Abd: Lap incisions clean.  +urostomy. +colostomy.  Abd more distended, hypoactive bowel sounds.   MSK: Full range of motion in extremities, no clubbing, no cyanosis  Skin: no rashes or lesions  Neuro:  Grossly intact, no sensory deficits       Diagnostic Data:    Labs:  Recent Labs   Lab 24  0700 24  0805   WBC 9.0 12.5*   HGB 12.6 15.0   MCV 86.4 89.0   .0 279.0       Recent Labs   Lab 24  0700 24  0805   * 154*   BUN 10 12   CREATSERUM 0.51* 0.59   CA 8.3* 8.8    136   K 3.8 3.9    108   CO2 24.0 22.0       Estimated Creatinine Clearance: 76.2 mL/min (based on SCr of 0.59 mg/dL).    No results for input(s): \"PTP\", \"INR\" in the last 168 hours.         COVID-19 Lab Results    COVID-19  No results found for: \"COVID19\"    Pro-Calcitonin  No results for input(s): \"PCT\" in the last 168 hours.    Cardiac  No results for input(s): \"TROP\",  \"PBNP\" in the last 168 hours.    Creatinine Kinase  No results for input(s): \"CK\" in the last 168 hours.    Inflammatory Markers  No results for input(s): \"CRP\", \"ULI\", \"LDH\", \"DDIMER\" in the last 168 hours.    Imaging: Imaging data reviewed in Epic.    Medications:    baclofen  10 mg Oral BID    diazePAM  5 mg Oral Daily    enoxaparin  40 mg Subcutaneous Daily    sennosides  17.2 mg Oral Nightly    docusate sodium  100 mg Oral BID    acetaminophen  650 mg Oral Q4H While awake    famotidine  20 mg Oral BID    Or    famotidine  20 mg Intravenous BID    Levothyroxine Sodium  125 mcg Oral Nightly       Assessment & Plan:      73 yr old female with PMH sig for interstitial cystitis, depression, anxiety, and hypothyroidism who presents s/p robotic assisted lap cystectomy with ileal conduit diversion and removal of port.       # Chronic urinary retention, interstitial cystitis   # Urostomy status  # Colostomy status  S/p robotic assisted lap cystectomy with ileal conduit diversion and removal of port  Post op care per  and gen surg  Encourage IS use  Suspect post po ileus, await further  recs     # Hypothyroidism   Cont levothyroxine      # Generalized anxiety d/o  Cont diazepam PRN    Plan of care: inpt care.    Plan of care discussed with patient or family at bedside.    Edmond Chacon DO    Supplementary Documentation:     Quality:  DVT Prophylaxis: lov subcutaneous   CODE status: FULL  Underwood: no  Central line: no  If COVID testing is negative, may discontinue isolation: yes     Estimated date of discharge: TBD  Discharge is dependent on: clinical course   At this point Ms. Lemus is expected to be discharge to: home     Plan of care discussed with pt

## 2024-05-20 ENCOUNTER — APPOINTMENT (OUTPATIENT)
Dept: GENERAL RADIOLOGY | Facility: HOSPITAL | Age: 74
DRG: 654 | End: 2024-05-20
Attending: PHYSICIAN ASSISTANT

## 2024-05-20 LAB
ANION GAP SERPL CALC-SCNC: 9 MMOL/L (ref 0–18)
BASOPHILS # BLD AUTO: 0.02 X10(3) UL (ref 0–0.2)
BASOPHILS NFR BLD AUTO: 0.2 %
BUN BLD-MCNC: 16 MG/DL (ref 9–23)
CALCIUM BLD-MCNC: 9.2 MG/DL (ref 8.5–10.1)
CHLORIDE SERPL-SCNC: 102 MMOL/L (ref 98–112)
CO2 SERPL-SCNC: 23 MMOL/L (ref 21–32)
CREAT BLD-MCNC: 0.56 MG/DL
EGFRCR SERPLBLD CKD-EPI 2021: 97 ML/MIN/1.73M2 (ref 60–?)
EOSINOPHIL # BLD AUTO: 0.07 X10(3) UL (ref 0–0.7)
EOSINOPHIL NFR BLD AUTO: 0.6 %
ERYTHROCYTE [DISTWIDTH] IN BLOOD BY AUTOMATED COUNT: 12.8 %
GLUCOSE BLD-MCNC: 137 MG/DL (ref 70–99)
GLUCOSE BLD-MCNC: 148 MG/DL (ref 70–99)
HCT VFR BLD AUTO: 47.1 %
HGB BLD-MCNC: 15.9 G/DL
IMM GRANULOCYTES # BLD AUTO: 0.04 X10(3) UL (ref 0–1)
IMM GRANULOCYTES NFR BLD: 0.3 %
LYMPHOCYTES # BLD AUTO: 0.62 X10(3) UL (ref 1–4)
LYMPHOCYTES NFR BLD AUTO: 5.3 %
MCH RBC QN AUTO: 28.9 PG (ref 26–34)
MCHC RBC AUTO-ENTMCNC: 33.8 G/DL (ref 31–37)
MCV RBC AUTO: 85.6 FL
MONOCYTES # BLD AUTO: 0.62 X10(3) UL (ref 0.1–1)
MONOCYTES NFR BLD AUTO: 5.3 %
NEUTROPHILS # BLD AUTO: 10.23 X10 (3) UL (ref 1.5–7.7)
NEUTROPHILS # BLD AUTO: 10.23 X10(3) UL (ref 1.5–7.7)
NEUTROPHILS NFR BLD AUTO: 88.3 %
OSMOLALITY SERPL CALC.SUM OF ELEC: 281 MOSM/KG (ref 275–295)
PLATELET # BLD AUTO: 285 10(3)UL (ref 150–450)
POTASSIUM SERPL-SCNC: 4.4 MMOL/L (ref 3.5–5.1)
RBC # BLD AUTO: 5.5 X10(6)UL
SODIUM SERPL-SCNC: 134 MMOL/L (ref 136–145)
WBC # BLD AUTO: 11.6 X10(3) UL (ref 4–11)

## 2024-05-20 PROCEDURE — 74019 RADEX ABDOMEN 2 VIEWS: CPT | Performed by: PHYSICIAN ASSISTANT

## 2024-05-20 PROCEDURE — 82962 GLUCOSE BLOOD TEST: CPT

## 2024-05-20 PROCEDURE — 85025 COMPLETE CBC W/AUTO DIFF WBC: CPT | Performed by: UROLOGY

## 2024-05-20 PROCEDURE — 80048 BASIC METABOLIC PNL TOTAL CA: CPT | Performed by: UROLOGY

## 2024-05-20 PROCEDURE — S0028 INJECTION, FAMOTIDINE, 20 MG: HCPCS | Performed by: UROLOGY

## 2024-05-20 PROCEDURE — 99214 OFFICE O/P EST MOD 30 MIN: CPT

## 2024-05-20 RX ORDER — SODIUM CHLORIDE 9 MG/ML
INJECTION, SOLUTION INTRAVENOUS CONTINUOUS
Status: DISCONTINUED | OUTPATIENT
Start: 2024-05-20 | End: 2024-05-23

## 2024-05-20 RX ORDER — ACETAMINOPHEN 10 MG/ML
1000 INJECTION, SOLUTION INTRAVENOUS EVERY 8 HOURS PRN
Status: DISPENSED | OUTPATIENT
Start: 2024-05-20 | End: 2024-05-21

## 2024-05-20 NOTE — PLAN OF CARE
A&Ox4, forgetful at times. VSS. RA. Denies chest pain and SOB.   GI: Abdomen soft, nondistended, slightly rounded d/t gas. Pt denies passing gas or stool into ostomy. Pt been walking hallway trying to help get gas out.   Reglan given for nausea.  : Ileal conduit is CDI draining clear yellow urine. Skin surrounding the conduit dressing is red and painful.   Pain controlled with PRN pain medications.   Up with standby assist and a walker.   Drains: L LISSETT drain to  bulb suction with gauze and tape is CDI with serous drainage.   Incisions: Laps x 4 with skin glue are CDI and JEANNIE.   Diet: Tolerating clears.   Ivs flushed and saline locked.   All appropriate safety measures in place. All questions and concerns addressed.

## 2024-05-20 NOTE — WOUND PROGRESS NOTE
[facility]  Inpatient Wound Care Contact Note    Frankie Lemus Patient Status:  Inpatient    1950 MRN [id number]   Location [facility] 3NW-A Attending Yael Cadena MD   Hosp Day # 3 PCP PEÑA EVANGELISTA     Attempted to see patient for follow up wound care session  Patient is currently unavailable secondary to a procedure / X -ray per RN.    We will continue to follow this patient while in-house and assist with wound care discharge planning.    Please call me at [phone] if you have any questions about this consultation and plan of care.    Thank you,    Clementine Cole RN BSN [facility]  [address] [address] [phone]  Spectralink: [phone]

## 2024-05-20 NOTE — PROGRESS NOTES
[facility]  Urology Progress Note    Frankie Lemus Patient Status:  Inpatient    1950 MRN [id number]   Location [facility] 3NW-A Attending Yael Cdaena MD   Hosp Day # 3 PCP PEÑA EVANGELISTA     Subjective:  Frankie Lemus is a(n) 73 year old female.    S/P RAL simple cystectomy and ileal conduit 24 with Dr. Cadena    Current complaints: +Abdominal discomfort.  Reports she is less distended.  +acid reflux.  Some nausea. Vomiting yesterday.  Passing some flatus.  No fever or chills.  Ambulating.      Some blistering under ostomy appliance per patient.      Objective:  General appearance: alert, appears stated age, and cooperative  Blood pressure 145/78, pulse 88, temperature 98.8 °F (37.1 °C), temperature source Oral, resp. rate 16, height 5' 9\" (1.753 m), weight 123 lb 7.3 oz (56 kg), SpO2 96%.  Lungs: non-labored respirations  Abdomen: soft, mildly distended with expected incisional tenderness.  Stoma productive with yellow urine (UOP - 925 mL/24 hours), distal ends of stents exiting stoma. LISSETT drain with SS fluid (output - 460 mL/24 hours)  Lab Results   Component Value Date    WBC 12.5 2024    HGB 15.0 2024    HCT 45.9 2024    .0 2024    CREATSERUM 0.59 2024    BUN 12 2024     2024    K 3.9 2024     2024    CO2 22.0 2024     2024    CA 8.8 2024        Assessment:    URINARY RETENTION, CHRONIC PELVIC PAIN  S/P RAL simple cystectomy and ileal conduit 24 with Dr. Cadena  Afebrile, VSS  AM labs to be collected  Ambulating    Plan:    -Encouraged ambulation and use of IS x 10/hr  -Keep head of bed elevated  -Clears  -Pain management  -Bowel regimen  -Supportive care  -Follow labs, temp, UOP, bowel regimen  -Lovenox 40 mg subcutaneous daily  -Stoma nurse consulted  -Hospitalist following    Above discussed with patient, nurse  Will update Dr. Cadena.    Madison Quintana,  ALESHIA  [facility]  Department of Urology  5/20/2024  5:44 AM    Addendum:  Recent Labs   Lab 05/18/24  0700 05/19/24  0805 05/20/24  0602   RBC 4.28 5.16 5.50*   HGB 12.6 15.0 15.9   HCT 37.0 45.9 47.1   MCV 86.4 89.0 85.6   MCH 29.4 29.1 28.9   MCHC 34.1 32.7 33.8   RDW 13.3 13.0 12.8   NEPRELIM 7.99* 11.56* 10.23*   WBC 9.0 12.5* 11.6*   .0 279.0 285.0       Recent Labs   Lab 05/18/24  0700 05/19/24  0805 05/20/24  0603   * 154* 137*   BUN 10 12 16   CREATSERUM 0.51* 0.59 0.56   EGFRCR 99 96 97   CA 8.3* 8.8 9.2    136 134*   K 3.8 3.9 4.4    108 102   CO2 24.0 22.0 23.0     WBC 9 > 12.5 > 11.6  Hgb stable  Serum creat 0.51 > 0.59 > 0.56    Above discussed with Dr. Cadena.    RENITA García  Urology    Addendum:  Patient has nausea/vomiting, not tolerating clears    NPO  IV fluids  Obstructive series    Above discussed with nurse, Dr. Cadena.    ANNE García  Urology

## 2024-05-20 NOTE — CM/SW NOTE
Informed by RN in rounds that pt has a new ileal conduit. Pt would benefit from HH services at [facility].    SW sent referral for HH via [facility]. Awaiting response. Will provide pt with options list when available.     to remain available for support and/or discharge planning.    LISA Tristan  Discharge Planner  [phone]

## 2024-05-20 NOTE — PLAN OF CARE
Patient with continued distention, vomiting throughout morning. No improvement with zofran/reglan. Obsutrctive series done; see results. NPO. IVF. Patient updated; questions addressed.

## 2024-05-20 NOTE — PROGRESS NOTES
[facility]   part of [facility] Hospitalist Progress Note     Frankie Lemus Patient Status:  Inpatient    1950 MRN [id number]   Location [facility] 3NW-A Attending Yael Cadena MD   Hosp Day # 3 PCP PEÑA EVANGELISTA     Follow Up:  The encounter diagnosis was Pelvic pain in female.    Subjective:     Patient seen and examined.  Continues to complain of bloating.  Overnight had \"hot/cold symptoms.  Had difficult time sleeping.  Felt nauseous this morning and reports one-time vomitus.  Tolerated clears this morning.    Objective:    Review of Systems:   10 point ROS completed and was negative, except for pertinent positive and negatives stated in subjective.    Vital signs:  Temp:  [98.5 °F (36.9 °C)-98.8 °F (37.1 °C)] 98.5 °F (36.9 °C)  Pulse:  [83-90] 90  Resp:  [14-17] 17  BP: (143-158)/(71-79) 158/78  SpO2:  [96 %-99 %] 99 %    Physical Exam:    Gen: No acute distress, alert and oriented x3, no focal neurologic deficits, appears tired  HEENT:  EOMI, OP clear, MMM  Pulm: Lungs clear bilaterally, normal respiratory effort  CV: Heart with regular rate and rhythm, no murmur.  Normal PMI.    Abd: Lap incisions clean.  +urostomy. +colostomy.  Abd more distended, hypoactive bowel sounds.   MSK: Full range of motion in extremities, no clubbing, no cyanosis  Skin: no rashes or lesions  Neuro:  Grossly intact, no sensory deficits       Diagnostic Data:    Labs:  Recent Labs   Lab 24  0700 24  0805 24  0602   WBC 9.0 12.5* 11.6*   HGB 12.6 15.0 15.9   MCV 86.4 89.0 85.6   .0 279.0 285.0       Recent Labs   Lab 24  0700 24  0805 24  0603   * 154* 137*   BUN 10 12 16   CREATSERUM 0.51* 0.59 0.56   CA 8.3* 8.8 9.2    136 134*   K 3.8 3.9 4.4    108 102   CO2 24.0 22.0 23.0       Estimated Creatinine Clearance: 80.3 mL/min (based on SCr of 0.56 mg/dL).    No results for input(s): \"PTP\", \"INR\" in the last 168  hours.         COVID-19 Lab Results    COVID-19  No results found for: \"COVID19\"    Pro-Calcitonin  No results for input(s): \"PCT\" in the last 168 hours.    Cardiac  No results for input(s): \"TROP\", \"PBNP\" in the last 168 hours.    Creatinine Kinase  No results for input(s): \"CK\" in the last 168 hours.    Inflammatory Markers  No results for input(s): \"CRP\", \"ULI\", \"LDH\", \"DDIMER\" in the last 168 hours.    Imaging: Imaging data reviewed in Epic.    Medications:    baclofen  10 mg Oral BID    diazePAM  5 mg Oral Daily    enoxaparin  40 mg Subcutaneous Daily    sennosides  17.2 mg Oral Nightly    docusate sodium  100 mg Oral BID    acetaminophen  650 mg Oral Q4H While awake    famotidine  20 mg Oral BID    Or    famotidine  20 mg Intravenous BID    Levothyroxine Sodium  125 mcg Oral Nightly       Assessment & Plan:      73 yr old female with PMH sig for interstitial cystitis, depression, anxiety, and hypothyroidism who presents s/p robotic assisted lap cystectomy with ileal conduit diversion and removal of port.       # Chronic urinary retention, interstitial cystitis   # Urostomy status  # Colostomy status  S/p robotic assisted lap cystectomy with ileal conduit diversion and removal of port  Post op care per  and gen surg  Encourage IS use  Suspect post po ileus, await further  recs     # Hypothyroidism   Cont levothyroxine      # Generalized anxiety d/o  Cont diazepam PRN    Plan of care: inpt care.    Plan of care discussed with patient or family at bedside.    Sabiha Larsen DO  Hospitalist  [facility]      Supplementary Documentation:     Quality:  DVT Prophylaxis: lov subcutaneous   CODE status: FULL  Underwood: no  Central line: no  If COVID testing is negative, may discontinue isolation: yes     Estimated date of discharge: TBD  Discharge is dependent on: clinical course   At this point Ms. Lemus is expected to be discharge to: home     Plan of care discussed with pt

## 2024-05-20 NOTE — CONSULTS
[facility]  Report of Inpatient Ostomy Consult       Frankie Lemus Patient Status:  Inpatient    1950 MRN [id number]   Location [facility] 3NW-A 324/324-A Attending Yael Cadena MD   Hosp Day # 3 PCP PEÑA EVANGELISTA       REASON FOR CONSULT:  Ostomy care    History of Present Illness:   Frankie Lemus is a a(n) 73 year old female.Patient seen at bedside with a fellow wound care ostomy nurse.Patient presents with  an ileal conduit that was surgically created on 24.    History:  Past Medical History:    Anesthesia complication    gets hyped up after surgeries/anesthesiologist    ANXIETY    Bowel dysfunction    CANCER    thyroid    Cancer (HCC)    Crohn's colitis (HCC)    seeing Jose    DEPRESSION    Disorder of thyroid    Exposure to medical diagnostic radiation    last dose -    HYPOTHYROIDISM    Interstitial cystitis    MENOPAUSE    Other and unspecified peripheral vertigo(386.19)    Personal history of antineoplastic chemotherapy    last chemo 2023    Rectal cancer (HCC)    Visual impairment    readers     Past Surgical History:   Procedure Laterality Date    Appendectomy      Colonoscopy      Colonoscopy  2018    MAC: tortuous colon, melanosis, 3 mm cecal polyp (tubular adenoma), int hemorrhoids, repeat     Colonoscopy N/A 2018    Procedure: COLONOSCOPY, POSSIBLE BIOPSY, POSSIBLE POLYPECTOMY 67024;  Surgeon: Helio De La Torre MD;  Location: [facility]    Cystourethroscopy  2011    Cysto, hydrodistention, rescue solution-      Cystourethroscopy  2012    cysto, kenalog injection - Dr. Lopez    Cystourethroscopy,fulgur <.5cm lesn  2011    Performed by FRANKIE LOPEZ at [facility]    Cystourethroscopy,ureter catheter  2011    Performed by FRANKIE LOPEZ at [facility]    Hernia surgery      Hysterectomy  2009    fibroids ([place])    Knee surgery Left     acl reconstruction-left knee     Knee surgery      arthoscopic x 3 knee    Other surgical history  11/25/2009    revision hysterectomy incision ([place])    Other surgical history  08/02/2010    Interstim trial Dr. Salamanca    Other surgical history  01/2012    nerve blocks with pain management team - no lasting effect    Thyroid ablation      thyroid    X-ray retrograde pyelogram  07/11/2011    Performed by MIROSLAVA DAILY at [facility]      Social History     Socioeconomic History    Marital status:    Tobacco Use    Smoking status: Never    Smokeless tobacco: Never   Vaping Use    Vaping status: Never Used   Substance and Sexual Activity    Alcohol use: No    Drug use: Yes     Comment: medicinal CHOCOLATE 1:1 THC/CBD NIGHTLY     Social Determinants of Health     Food Insecurity: No Food Insecurity (5/17/2024)    Food Insecurity     Food Insecurity: Never true   Transportation Needs: No Transportation Needs (5/17/2024)    Transportation Needs     Lack of Transportation: No   Housing Stability: Low Risk  (5/17/2024)    Housing Stability     Housing Instability: No       ASSESSMENT:    Stoma location: RLQ  Stoma type: ileal conduit   Stoma color: pink  Stoma condition: edematous  Stoma diameter:  1 1/8\"  Ostomy output: urine  Stoma height: adequate  Peristomal skin: blistered- resolving at 1 o' clock  and 7 o' clock  Pouching system:two piece  Able to care for stoma: Yes [ Pt has a colostomy on the left quadrant of the abdomen that was created on February 2023]    Assessment of Learning Readiness:  Barriers/Limitations:  None      Type of ostomy:  ileal conduit    Post-Operative Teaching:  DEMO RETURN DEMONSTRATION     Remove and re-apply clamp  yes no   Empty/clean pouch  yes no   Measure/cut stomal opening  yes no   Stoma paste or barrier ring  yes no   Remove wafer or pouch   yes no       Other topics Discussed?   Fecal odor/gas control  N/A    Overnight drainage  yes    Diet  yes    Emergency supplies  yes    [employer] referral/Secure  start program registration yes    Purchasing supplies  yes        Teaching tools used    Video yes    Learning packet   yes    Demonstration  yes    Return Demonstration  no            Outcome of Education:  Shows understanding      IMPRESSION/PLAN:    Completed general education and pouch change with the patient. Discussed [place] secure start program and patient agreed to sign up. Encouraged to read the provided written materials and watch the educational videos. All questions were answered at this time.Plan of care discussed with the nurse.    Products used: Clarice urostomy pouch #43622 and Clarice urostomy drain tube adapter #[id number]     Would benefit from Home Health: Yes.Patient is requesting for home health care.      Ostomy Care Recommendations:   Pouch/Appliance change with a frequency of 3- 7 days  using products: [place] urostomy pouch #29921 and Clarice urostomy drain tube adapter #7331  Cover blisters with non thin foam and change every 3 days and as needed      Thank you for allowing me to participate in the care of your patient.  Time Spent: 1 Hour.    Thank you.    Clementine Cole RN  Wound/Ostomy care pager [phone]  Wound Clinic [phone]  5/20/2024  3:04 PM

## 2024-05-21 LAB
ANION GAP SERPL CALC-SCNC: 7 MMOL/L (ref 0–18)
BASOPHILS # BLD AUTO: 0.02 X10(3) UL (ref 0–0.2)
BASOPHILS NFR BLD AUTO: 0.2 %
BLOOD TYPE BARCODE: 5100
BUN BLD-MCNC: 26 MG/DL (ref 9–23)
CALCIUM BLD-MCNC: 8.3 MG/DL (ref 8.5–10.1)
CHLORIDE SERPL-SCNC: 108 MMOL/L (ref 98–112)
CO2 SERPL-SCNC: 23 MMOL/L (ref 21–32)
CREAT BLD-MCNC: 0.48 MG/DL
EGFRCR SERPLBLD CKD-EPI 2021: 101 ML/MIN/1.73M2 (ref 60–?)
EOSINOPHIL # BLD AUTO: 0.02 X10(3) UL (ref 0–0.7)
EOSINOPHIL NFR BLD AUTO: 0.2 %
ERYTHROCYTE [DISTWIDTH] IN BLOOD BY AUTOMATED COUNT: 12.7 %
GLUCOSE BLD-MCNC: 132 MG/DL (ref 70–99)
HCT VFR BLD AUTO: 42.4 %
HGB BLD-MCNC: 14.6 G/DL
IMM GRANULOCYTES # BLD AUTO: 0.05 X10(3) UL (ref 0–1)
IMM GRANULOCYTES NFR BLD: 0.5 %
LYMPHOCYTES # BLD AUTO: 0.5 X10(3) UL (ref 1–4)
LYMPHOCYTES NFR BLD AUTO: 5.1 %
MCH RBC QN AUTO: 29.4 PG (ref 26–34)
MCHC RBC AUTO-ENTMCNC: 34.4 G/DL (ref 31–37)
MCV RBC AUTO: 85.3 FL
MONOCYTES # BLD AUTO: 0.58 X10(3) UL (ref 0.1–1)
MONOCYTES NFR BLD AUTO: 5.9 %
NEUTROPHILS # BLD AUTO: 8.68 X10 (3) UL (ref 1.5–7.7)
NEUTROPHILS # BLD AUTO: 8.68 X10(3) UL (ref 1.5–7.7)
NEUTROPHILS NFR BLD AUTO: 88.1 %
OSMOLALITY SERPL CALC.SUM OF ELEC: 293 MOSM/KG (ref 275–295)
PLATELET # BLD AUTO: 270 10(3)UL (ref 150–450)
POTASSIUM SERPL-SCNC: 4.1 MMOL/L (ref 3.5–5.1)
RBC # BLD AUTO: 4.97 X10(6)UL
SODIUM SERPL-SCNC: 138 MMOL/L (ref 136–145)
UNIT VOLUME: 350 ML
WBC # BLD AUTO: 9.9 X10(3) UL (ref 4–11)

## 2024-05-21 PROCEDURE — 80048 BASIC METABOLIC PNL TOTAL CA: CPT | Performed by: UROLOGY

## 2024-05-21 PROCEDURE — 85025 COMPLETE CBC W/AUTO DIFF WBC: CPT | Performed by: UROLOGY

## 2024-05-21 PROCEDURE — S0028 INJECTION, FAMOTIDINE, 20 MG: HCPCS | Performed by: UROLOGY

## 2024-05-21 NOTE — CM/SW NOTE
05/21/24 1100   CM/SW Referral Data   Referral Source Social Work (self-referral)   Reason for Referral Discharge planning   Informant EMR;Clinical Staff Member;Patient;Spouse/Significant Other   Medical Hx   Does patient have an established PCP? Yes   Patient Info   Patient's Current Mental Status at Time of Assessment Alert;Oriented   Patient's Home Environment House   Number of Levels in Home 2   Number of Stair in Home 15   Patient lives with Spouse/Significant other   Patient Status Prior to Admission   Independent with ADLs and Mobility Yes   Discharge Needs   Anticipated D/C needs Home health care   Choice of Post-Acute Provider   Informed patient of right to choose their preferred provider Yes   List of appropriate post-acute services provided to patient/family with quality data Yes   Patient/family choice [facility]   Information given to Patient;Spouse/Significant other   [facility] financial disclosure given Yes     Patient is a 74 y/o female s/p robotic assisted lap cystectomy with ileal conduit diversion and removal of port. Pt would benefit from HH services at [facility], as the ileal conduit is new for the pt.    KENDRICK met with pt and pt's  at bedside to discuss discharge planning. Pt reports she lives in a 2 level house with her . Pt stated there are about 15 stairs in the home and they own some DME including an elevated toilet seat, crutches and a cane. Per pt's report, prior to admission pt was independent with her ADLs.    KENDRICK inquired about HH services at [facility]. Pt is agreeable to receiving HH at [facility] and stated it would be helpful for her given the new ileal conduit she will have to manage. KENDRICK provided pt with available HH options list. Pt reviewed list and chose to go with [facility] ([facility]).    KENDRICK reserved [facility] on [facility]. Message sent to [facility] liaison, Lotus, informing her of pt's choice.     to remain available for support and/or discharge  planning.    Aurora Sage \[facility] [place] [facility]\""  Discharge Planner  [phone]

## 2024-05-21 NOTE — HOME CARE LIAISON
Received referral via [facility] for Home Health services. Spoke w/ patient and her  who is agreeable with [facility]. Contact information placed on AVS.

## 2024-05-21 NOTE — PLAN OF CARE
Assumed care 0730.  Alert and oriented x4  RA. IS encouraged  NPO with ice chips.   PIV infusing 0.9 @ 100 ml/hr.  Ileal conduit. Attached to vargas. Draining to gravity.   LISSETT draining to bulb suction.   Colostomy- Large amount of output. Ostomy changed.   3 lap sites right upper chest site. C/d/I.  Ambulating   STBY  Electrolyte NC protocol.   Continue to monitor pt.       Problem: PAIN - ADULT  Goal: Verbalizes/displays adequate comfort level or patient's stated pain goal  Description: INTERVENTIONS:  - Encourage pt to monitor pain and request assistance  - Assess pain using appropriate pain scale  - Administer analgesics based on type and severity of pain and evaluate response  - Implement non-pharmacological measures as appropriate and evaluate response  - Consider cultural and social influences on pain and pain management  - Manage/alleviate anxiety  - Utilize distraction and/or relaxation techniques  - Monitor for opioid side effects  - Notify MD/LIP if interventions unsuccessful or patient reports new pain  - Anticipate increased pain with activity and pre-medicate as appropriate  Outcome: Progressing     Problem: RISK FOR INFECTION - ADULT  Goal: Absence of fever/infection during anticipated neutropenic period  Description: INTERVENTIONS  - Monitor WBC  - Administer growth factors as ordered  - Implement neutropenic guidelines  Outcome: Progressing     Problem: SAFETY ADULT - FALL  Goal: Free from fall injury  Description: INTERVENTIONS:  - Assess pt frequently for physical needs  - Identify cognitive and physical deficits and behaviors that affect risk of falls.  - [facility] fall precautions as indicated by assessment.  - Educate pt/family on patient safety including physical limitations  - Instruct pt to call for assistance with activity based on assessment  - Modify environment to reduce risk of injury  - Provide assistive devices as appropriate  - Consider OT/PT consult to assist with  strengthening/mobility  - Encourage toileting schedule  Outcome: Progressing     Problem: DISCHARGE PLANNING  Goal: Discharge to home or other facility with appropriate resources  Description: INTERVENTIONS:  - Identify barriers to discharge w/pt and caregiver  - Include patient/family/discharge partner in discharge planning  - Arrange for needed discharge resources and transportation as appropriate  - Identify discharge learning needs (meds, wound care, etc)  - Arrange for interpreters to assist at discharge as needed  - Consider post-discharge preferences of patient/family/discharge partner  - Complete POLST form as appropriate  - Assess patient's ability to be responsible for managing their own health  - Refer to Case Management Department for coordinating discharge planning if the patient needs post-hospital services based on physician/LIP order or complex needs related to functional status, cognitive ability or social support system  Outcome: Progressing

## 2024-05-21 NOTE — DISCHARGE INSTRUCTIONS
Sometimes managing your health at home requires assistance.  The [facility] team has recognized your preference to use [facility].  They can be reached by phone at [phone].  The fax number for your reference is [phone].  A representative from the home health agency will contact you or your family to schedule your first visit.               Eat a heart healthy diet  Stay well hydrated  No strenuous activities  Keep incisions clean & dry  OK to shower  No tub baths          ---------  No lifting >15 lbs. It is okay for stairs, but go slowly. Continue ambulating 5-6 times/day. You may shower 5-10 minutes, pat yourself dry after your shower. Minimize time in the vehicle for the next 2-3 weeks.   Take pain medications as needed - you may transition to extra strength tylenol or regular tylenol after a few days. Please do not exceed 3,000mg daily. The medication may make you nauseated if taken on an empty stomach. It may also cause constipation, so please take stool softener twice daily, and if needed fiber supplement or miralax.   Your appetite will continue to improve over the next 1-2 weeks. Go slowly with your diet. If you experience increased nausea/vomiting please call our office.   The site where your LISSETT drain was removed will continue to leak for the next 48-72 hours. Continue to change the dressing as needed. If you notice the other incisions becoming red, swollen, warm to touch, or draining please call our office.     Follow-up with Dr. Cadena on 6/3/24.  Start Bactrim on 6/2/24 in preparation for stent removal at time of office visit unless stents have already fallen out.      [facility]  [phone]

## 2024-05-22 LAB
ANION GAP SERPL CALC-SCNC: 5 MMOL/L (ref 0–18)
BASOPHILS # BLD AUTO: 0.02 X10(3) UL (ref 0–0.2)
BASOPHILS NFR BLD AUTO: 0.4 %
BUN BLD-MCNC: 17 MG/DL (ref 9–23)
CALCIUM BLD-MCNC: 8 MG/DL (ref 8.5–10.1)
CHLORIDE SERPL-SCNC: 112 MMOL/L (ref 98–112)
CO2 SERPL-SCNC: 23 MMOL/L (ref 21–32)
CREAT BLD-MCNC: 0.43 MG/DL
CREAT FLD-MCNC: 0.36 MG/DL
EGFRCR SERPLBLD CKD-EPI 2021: 103 ML/MIN/1.73M2 (ref 60–?)
EOSINOPHIL # BLD AUTO: 0.14 X10(3) UL (ref 0–0.7)
EOSINOPHIL NFR BLD AUTO: 2.6 %
ERYTHROCYTE [DISTWIDTH] IN BLOOD BY AUTOMATED COUNT: 12.9 %
GLUCOSE BLD-MCNC: 93 MG/DL (ref 70–99)
HCT VFR BLD AUTO: 40.9 %
HGB BLD-MCNC: 13.7 G/DL
IMM GRANULOCYTES # BLD AUTO: 0.02 X10(3) UL (ref 0–1)
IMM GRANULOCYTES NFR BLD: 0.4 %
LYMPHOCYTES # BLD AUTO: 0.54 X10(3) UL (ref 1–4)
LYMPHOCYTES NFR BLD AUTO: 10 %
MCH RBC QN AUTO: 29.1 PG (ref 26–34)
MCHC RBC AUTO-ENTMCNC: 33.5 G/DL (ref 31–37)
MCV RBC AUTO: 86.8 FL
MONOCYTES # BLD AUTO: 0.52 X10(3) UL (ref 0.1–1)
MONOCYTES NFR BLD AUTO: 9.6 %
NEUTROPHILS # BLD AUTO: 4.16 X10 (3) UL (ref 1.5–7.7)
NEUTROPHILS # BLD AUTO: 4.16 X10(3) UL (ref 1.5–7.7)
NEUTROPHILS NFR BLD AUTO: 77 %
OSMOLALITY SERPL CALC.SUM OF ELEC: 291 MOSM/KG (ref 275–295)
PLATELET # BLD AUTO: 240 10(3)UL (ref 150–450)
POTASSIUM SERPL-SCNC: 3.3 MMOL/L (ref 3.5–5.1)
RBC # BLD AUTO: 4.71 X10(6)UL
SODIUM SERPL-SCNC: 140 MMOL/L (ref 136–145)
WBC # BLD AUTO: 5.4 X10(3) UL (ref 4–11)

## 2024-05-22 PROCEDURE — S0028 INJECTION, FAMOTIDINE, 20 MG: HCPCS | Performed by: UROLOGY

## 2024-05-22 PROCEDURE — 80048 BASIC METABOLIC PNL TOTAL CA: CPT | Performed by: UROLOGY

## 2024-05-22 PROCEDURE — 82570 ASSAY OF URINE CREATININE: CPT | Performed by: PHYSICIAN ASSISTANT

## 2024-05-22 PROCEDURE — 85025 COMPLETE CBC W/AUTO DIFF WBC: CPT | Performed by: UROLOGY

## 2024-05-22 RX ORDER — POTASSIUM CHLORIDE 20 MEQ/1
40 TABLET, EXTENDED RELEASE ORAL ONCE
Status: COMPLETED | OUTPATIENT
Start: 2024-05-22 | End: 2024-05-22

## 2024-05-22 NOTE — PROGRESS NOTES
[facility]   part of [facility] Hospitalist Progress Note     Frankie Lemus Patient Status:  Inpatient    1950 MRN [id number]   Location [facility] 3NW-A Attending Yael Cadena MD   Hosp Day # 4 PCP PEÑA EVANGELISTA     Follow Up:  The encounter diagnosis was Pelvic pain in female.    Subjective:     Patient seen and examined.  When I evaluated the patient, her ostomy had just been changed due to increased output.  Her pain was more controlled.  She is in better spirits today than yesterday.    Objective:    Review of Systems:   10 point ROS completed and was negative, except for pertinent positive and negatives stated in subjective.    Vital signs:  Temp:  [97.8 °F (36.6 °C)-98.1 °F (36.7 °C)] 97.8 °F (36.6 °C)  Pulse:  [77-89] 77  Resp:  [16] 16  BP: (137-141)/(75-85) 141/75  SpO2:  [94 %-96 %] 96 %    Physical Exam:    Gen: No acute distress, alert and oriented x3, no focal neurologic deficits, appears tired  HEENT:  EOMI, OP clear, MMM  Pulm: Lungs clear bilaterally, normal respiratory effort  CV: Heart with regular rate and rhythm, no murmur.  Normal PMI.    Abd: Lap incisions clean.  +urostomy. +colostomy with green liquid output abdominal distention has improved active bowel sounds  MSK: Full range of motion in extremities, no clubbing, no cyanosis  Skin: no rashes or lesions  Neuro:  Grossly intact, no sensory deficits       Diagnostic Data:    Labs:  Recent Labs   Lab 24  0700 24  0805 24  0602 24  0528   WBC 9.0 12.5* 11.6* 9.9   HGB 12.6 15.0 15.9 14.6   MCV 86.4 89.0 85.6 85.3   .0 279.0 285.0 270.0       Recent Labs   Lab 24  0805 24  0603 24  0528   * 137* 132*   BUN 12 16 26*   CREATSERUM 0.59 0.56 0.48*   CA 8.8 9.2 8.3*    134* 138   K 3.9 4.4 4.1    102 108   CO2 22.0 23.0 23.0       Estimated Creatinine Clearance: 93.7 mL/min (A) (based on SCr of 0.48 mg/dL (L)).    No  results for input(s): \"PTP\", \"INR\" in the last 168 hours.         COVID-19 Lab Results    COVID-19  No results found for: \"COVID19\"    Pro-Calcitonin  No results for input(s): \"PCT\" in the last 168 hours.    Cardiac  No results for input(s): \"TROP\", \"PBNP\" in the last 168 hours.    Creatinine Kinase  No results for input(s): \"CK\" in the last 168 hours.    Inflammatory Markers  No results for input(s): \"CRP\", \"ULI\", \"LDH\", \"DDIMER\" in the last 168 hours.    Imaging: Imaging data reviewed in Epic.    Medications:    baclofen  10 mg Oral BID    diazePAM  5 mg Oral Daily    enoxaparin  40 mg Subcutaneous Daily    sennosides  17.2 mg Oral Nightly    docusate sodium  100 mg Oral BID    [Held by provider] acetaminophen  650 mg Oral Q4H While awake    famotidine  20 mg Oral BID    Or    famotidine  20 mg Intravenous BID    Levothyroxine Sodium  125 mcg Oral Nightly       Assessment & Plan:      73 yr old female with PMH sig for interstitial cystitis, depression, anxiety, and hypothyroidism who presents s/p robotic assisted lap cystectomy with ileal conduit diversion and removal of port.       # Chronic urinary retention, interstitial cystitis   # Urostomy status  # Colostomy status  #Postop ileus versus distal obstruction  S/p robotic assisted lap cystectomy with ileal conduit diversion and removal of port  Post op care per  and gen surg  Encourage IS use  Obstructive series suggestive of postop ileus versus distal obstruction,-patient NPO.  Diet per urology    # Hypothyroidism   Cont levothyroxine      # Generalized anxiety d/o  Cont diazepam PRN    Plan of care: inpt care.    Plan of care discussed with patient or family at bedside.    Sabiha Larsen DO  Hospitalist  [facility] [place]      Supplementary Documentation:     Quality:  DVT Prophylaxis: lov subcutaneous   CODE status: FULL  [place]wood: no  Central line: no  If COVID testing is negative, may discontinue isolation: yes     Estimated date of discharge:  TBD  Discharge is dependent on: clinical course   At this point Ms. Lemus is expected to be discharge to: home     Plan of care discussed with pt and bedside RN.

## 2024-05-22 NOTE — PLAN OF CARE
Pt A&Ox4, resting in bed; family at bedside.  Resp: RA,   GI/: ileostomy, colostomy  Activity/Safety: up w/asst  Skin: L abd LISSETT drain; abd lap sites x3 with glue; chest port removed w/glue JEANNIE  Pain: no meds requested at this time  Pt and family updated on and agreeable to POC; CLD for now, await ROBF.

## 2024-05-22 NOTE — PROGRESS NOTES
[facility]  Urology Progress Note    Frankie Lemus Patient Status:  Inpatient    1950 MRN [id number]   Location [facility] 3NW-A Attending Yael Cadena MD   Hosp Day # 5 PCP PEÑA EVANGELISTA     Subjective:  Frankie Lemus is a(n) 73 year old female.    S/P RAL simple cystectomy and ileal conduit 24 with Dr. Cadena    Current complaints: Pain controlled.  No nausea, vomiting, fever, or chills.  Tolerating some ice chips.  No CP, SOB.  Ambulating.  +flatus/stool    Objective:  General appearance: alert, appears stated age, and cooperative  Blood pressure 131/79, pulse 78, temperature 97.6 °F (36.4 °C), temperature source Oral, resp. rate 18, height 5' 9\" (1.753 m), weight 123 lb 7.3 oz (56 kg), SpO2 96%.  Lungs: non-labored respirations  Abdomen: soft, rounded (improved) with expected incisional tenderness.  Stoma productive with blood tinged urine (UOP - 1990 mL/24 hours), distal ends of catheters exiting stoma.  Colostomy with liquid stool (output = 650 mL/24 hours).  LISSETT drain drain with SS fluid (output - 220 mL/24 hours)        No results found for this visit on 24.     XR ABDOMEN OBSTRUCTIVE SERIES ROUTINE(2 VW)(CPT=74019)    Result Date: 2024  CONCLUSION:  Diffuse air-filled distended bowel throughout abdomen most likely represents diffuse ileus with distal obstruction not excluded.  Postoperative changes are noted.   LOCATION:  [facility]    Dictated by (CST): Robby Byrd MD on 2024 at 1:00 PM     Finalized by (CST): Robby Byrd MD on 2024 at 1:01 PM         Assessment:    URINARY RETENTION, CHRONIC PELVIC PAIN  S/P RAL simple cystectomy and ileal conduit 24 with Dr. Cadena  Afebrile, VSS  AM labs pending  Pathology pending  Ambulating     ILEUS - improving/resolving  Obstructive series 24: Diffuse air-filled distended bowel throughout abdomen most likely represents diffuse ileus with distal obstruction not excluded.  Postoperative changes  are noted     Plan:    Encouraged ambulation and use of IS x 10/hr  Clears  Pain management - limit narcotics as able  Bowel regimen  Supportive care  Follow labs, temp, UOP, drain output  Check drain creat  Lovenox 40 mg subcutaneous daily   Stoma nurse and hospitalist following    Above discussed with patient, nurse  Will update Dr. Surinder Quintana PA-C  [facility]  Department of Urology  5/22/2024  5:31 AM    Addendum:  Recent Labs   Lab 05/20/24  0602 05/21/24  0528 05/22/24  0516   RBC 5.50* 4.97 4.71   HGB 15.9 14.6 13.7   HCT 47.1 42.4 40.9   MCV 85.6 85.3 86.8   MCH 28.9 29.4 29.1   MCHC 33.8 34.4 33.5   RDW 12.8 12.7 12.9   NEPRELIM 10.23* 8.68* 4.16   WBC 11.6* 9.9 5.4   .0 270.0 240.0       Recent Labs   Lab 05/20/24  0603 05/21/24  0528 05/22/24  0516   * 132* 93   BUN 16 26* 17   CREATSERUM 0.56 0.48* 0.43*   EGFRCR 97 101 103   CA 9.2 8.3* 8.0*   * 138 140   K 4.4 4.1 3.3*    108 112   CO2 23.0 23.0 23.0     Above reviewed with Dr. Cadena.    ANNE García  Urology

## 2024-05-22 NOTE — CONSULTS
[facility]  Inpatient Wound Care Contact Note    Frankie Lemus Patient Status:  Inpatient    1950 MRN [id number]   Location [facility] 3NW-A Attending Yael Cadena MD   Hosp Day # 5 PCP PEÑA EVANGELISTA     Re consult for ostomy care:  Received message from RN earlier this morning that patient has questions regarding her ostomy.  Spoke with the patient  today.She states :\" it was a mistake when she mentioned earlier this morning with the Urology PA that she needs the ostomy nurse so just cancel this \".  No issues with the colostomy and the ileal conduit at this time.    We will continue to follow this patient while in-house and assist with ostomy care discharge planning.  Please call me at [phone] if you have any questions about this consultation and plan of care.       Thank you,  Clementine Cole RN BSN [facility]  [address] [id number] [phone]  Spectralink: [phone]

## 2024-05-22 NOTE — PLAN OF CARE
A&Ox4. VSS. RA. Denies chest pain and SOB.   GI: Abdomen soft, nondistended, tenderness noted. Passing gas via ostomy.   Denies nausea.   : Voids via colostomy and ileal conduit   Pain controlled with PRN pain medications.   Up with standby assist.   Drains: Cliff to side of abdomen, dressing CDI, serosanguinous output   Incisions: laps x 3, chemo port removed   Diet: npo with ice chips   IVF running per order.   All appropriate safety measures in place. All questions and concerns addressed

## 2024-05-22 NOTE — PROGRESS NOTES
[facility]   part of [facility] Hospitalist Progress Note     Frankie Lemus Patient Status:  Inpatient    1950 MRN [id number]   Location [facility] 3NW-A Attending Yael Cadena MD   Hosp Day # 5 PCP PEÑA EVANGELISTA     Follow Up:  The encounter diagnosis was Pelvic pain in female.    Subjective:     Patient seen and examined.  Laying in bed.  Per RN, having good output through the ostomy.  Wants to have another session for ostomy teaching.  Pain is controlled.  Tolerating clears.    Objective:    Review of Systems:   10 point ROS completed and was negative, except for pertinent positive and negatives stated in subjective.    Vital signs:  Temp:  [97.6 °F (36.4 °C)-98.3 °F (36.8 °C)] 98.3 °F (36.8 °C)  Pulse:  [75-86] 75  Resp:  [16-18] 16  BP: (125-133)/(69-79) 125/69  SpO2:  [94 %-96 %] 95 %    Physical Exam:    Gen: No acute distress, alert and oriented x3, no focal neurologic deficits, appears tired  HEENT:  EOMI, OP clear, MMM  Pulm: Lungs clear bilaterally, normal respiratory effort  CV: Heart with regular rate and rhythm, no murmur.  Normal PMI.    Abd: Lap incisions clean.  +urostomy. +colostomy with green liquid output abdominal distention has almost resolved, active bowel sounds  MSK: Full range of motion in extremities, no clubbing, no cyanosis  Skin: no rashes or lesions  Neuro:  Grossly intact, no sensory deficits       Diagnostic Data:    Labs:  Recent Labs   Lab 24  0700 24  0805 24  0602 24  0528 24  0516   WBC 9.0 12.5* 11.6* 9.9 5.4   HGB 12.6 15.0 15.9 14.6 13.7   MCV 86.4 89.0 85.6 85.3 86.8   .0 279.0 285.0 270.0 240.0       Recent Labs   Lab 24  0603 24  0528 24  0516   * 132* 93   BUN 16 26* 17   CREATSERUM 0.56 0.48* 0.43*   CA 9.2 8.3* 8.0*   * 138 140   K 4.4 4.1 3.3*    108 112   CO2 23.0 23.0 23.0       Estimated Creatinine Clearance: 104.5 mL/min (A) (based  on SCr of 0.43 mg/dL (L)).    No results for input(s): \"PTP\", \"INR\" in the last 168 hours.         COVID-19 Lab Results    COVID-19  No results found for: \"COVID19\"    Pro-Calcitonin  No results for input(s): \"PCT\" in the last 168 hours.    Cardiac  No results for input(s): \"TROP\", \"PBNP\" in the last 168 hours.    Creatinine Kinase  No results for input(s): \"CK\" in the last 168 hours.    Inflammatory Markers  No results for input(s): \"CRP\", \"ULI\", \"LDH\", \"DDIMER\" in the last 168 hours.    Imaging: Imaging data reviewed in Epic.    Medications:    baclofen  10 mg Oral BID    diazePAM  5 mg Oral Daily    enoxaparin  40 mg Subcutaneous Daily    sennosides  17.2 mg Oral Nightly    docusate sodium  100 mg Oral BID    [Held by provider] acetaminophen  650 mg Oral Q4H While awake    famotidine  20 mg Oral BID    Or    famotidine  20 mg Intravenous BID    Levothyroxine Sodium  125 mcg Oral Nightly       Assessment & Plan:      73 yr old female with PMH sig for interstitial cystitis, depression, anxiety, and hypothyroidism who presents s/p robotic assisted lap cystectomy with ileal conduit diversion and removal of port.  Unfortunately course complicated by postop ileus, now improving.     # Chronic urinary retention, interstitial cystitis   # Urostomy status  # Colostomy status  #Postop ileus versus distal obstruction-resolving  S/p robotic assisted lap cystectomy with ileal conduit diversion and removal of port  Post op care per    Encourage IS use  Obstructive series suggestive of postop ileus versus distal obstruction 5/20,-diet advanced to clear    # Hypothyroidism   Cont levothyroxine      # Generalized anxiety d/o  Cont diazepam PRN    Plan of care: inpt care.    Plan of care discussed with patient or family at bedside.    Sabiha Larsen DO  Hospitalist  [facility] and [facility]      Supplementary Documentation:     Quality:  DVT Prophylaxis: lov subcutaneous   CODE status: FULL  [place]: no  Central line: no  If COVID  testing is negative, may discontinue isolation: yes     Estimated date of discharge: TBD  Discharge is dependent on: clinical course   At this point Ms. Lemus is expected to be discharge to: home     Plan of care discussed with pt and bedside RN.

## 2024-05-23 LAB
ANION GAP SERPL CALC-SCNC: 5 MMOL/L (ref 0–18)
BASOPHILS # BLD AUTO: 0.03 X10(3) UL (ref 0–0.2)
BASOPHILS NFR BLD AUTO: 0.5 %
BUN BLD-MCNC: 9 MG/DL (ref 9–23)
CALCIUM BLD-MCNC: 8.5 MG/DL (ref 8.5–10.1)
CHLORIDE SERPL-SCNC: 105 MMOL/L (ref 98–112)
CO2 SERPL-SCNC: 24 MMOL/L (ref 21–32)
CREAT BLD-MCNC: 0.5 MG/DL
EGFRCR SERPLBLD CKD-EPI 2021: 100 ML/MIN/1.73M2 (ref 60–?)
EOSINOPHIL # BLD AUTO: 0.28 X10(3) UL (ref 0–0.7)
EOSINOPHIL NFR BLD AUTO: 4.5 %
ERYTHROCYTE [DISTWIDTH] IN BLOOD BY AUTOMATED COUNT: 12.7 %
GLUCOSE BLD-MCNC: 98 MG/DL (ref 70–99)
HCT VFR BLD AUTO: 39.9 %
HGB BLD-MCNC: 13.7 G/DL
IMM GRANULOCYTES # BLD AUTO: 0.03 X10(3) UL (ref 0–1)
IMM GRANULOCYTES NFR BLD: 0.5 %
LYMPHOCYTES # BLD AUTO: 0.48 X10(3) UL (ref 1–4)
LYMPHOCYTES NFR BLD AUTO: 7.6 %
MCH RBC QN AUTO: 29.3 PG (ref 26–34)
MCHC RBC AUTO-ENTMCNC: 34.3 G/DL (ref 31–37)
MCV RBC AUTO: 85.3 FL
MONOCYTES # BLD AUTO: 0.59 X10(3) UL (ref 0.1–1)
MONOCYTES NFR BLD AUTO: 9.4 %
NEUTROPHILS # BLD AUTO: 4.88 X10 (3) UL (ref 1.5–7.7)
NEUTROPHILS # BLD AUTO: 4.88 X10(3) UL (ref 1.5–7.7)
NEUTROPHILS NFR BLD AUTO: 77.5 %
OSMOLALITY SERPL CALC.SUM OF ELEC: 277 MOSM/KG (ref 275–295)
PLATELET # BLD AUTO: 233 10(3)UL (ref 150–450)
POTASSIUM SERPL-SCNC: 3.8 MMOL/L (ref 3.5–5.1)
POTASSIUM SERPL-SCNC: 3.8 MMOL/L (ref 3.5–5.1)
RBC # BLD AUTO: 4.68 X10(6)UL
SODIUM SERPL-SCNC: 134 MMOL/L (ref 136–145)
WBC # BLD AUTO: 6.3 X10(3) UL (ref 4–11)

## 2024-05-23 PROCEDURE — 84132 ASSAY OF SERUM POTASSIUM: CPT | Performed by: STUDENT IN AN ORGANIZED HEALTH CARE EDUCATION/TRAINING PROGRAM

## 2024-05-23 PROCEDURE — 85025 COMPLETE CBC W/AUTO DIFF WBC: CPT | Performed by: STUDENT IN AN ORGANIZED HEALTH CARE EDUCATION/TRAINING PROGRAM

## 2024-05-23 PROCEDURE — 80048 BASIC METABOLIC PNL TOTAL CA: CPT | Performed by: STUDENT IN AN ORGANIZED HEALTH CARE EDUCATION/TRAINING PROGRAM

## 2024-05-23 NOTE — PLAN OF CARE
Received pt at 1930. Pt is A&O x 4; follows commands, not as anxious as prior shift with this writer. Pt is on RA, VSS, advanced to full liquid diet. Still bloated but not feeling nauseous. Pt has ileal conduit to vargas bag and colostomy from 2/2023. Pt denies pain and ambulates with standby assist. IV access is patent currently . Shift assessment performed, HS meds given. NOC safety plan in place; bed in low position, call light and personal items within reach, pt encouraged to call staff for assistance.    POC  Waiting on ROBF for discharge

## 2024-05-23 NOTE — PLAN OF CARE
Pt a&o x 4.  Pleasant & cooperative w/ care  Up ambulating in halls w/ steady gait, using rolling walker  Tolerating soft diet  Colostomy producing soft brown stool  Ileostomy producing light yellow urine  Both appliances secured well to abdominal wall  Cliff producing clear pink fluid  Surgical sites cdi, well approximated & helen  Areas to abdomen w/ open areas, ? Blisters  Plan of care: home tomorrow

## 2024-05-23 NOTE — PROGRESS NOTES
[facility]  Urology Progress Note    Frankie Lemus Patient Status:  Inpatient    1950 MRN [id number]   Location [facility] 3NW-A Attending Yael Cadena MD   Hosp Day # 6 PCP PEÑA EVANGELISTA     Subjective:  Frankie Lemus is a(n) 73 year old female.    S/P RAL simple cystectomy and ileal conduit 24 with Dr. Cadena     Current complaints: Pain controlled.  No nausea, vomiting, fever, or chills.  Tolerating full liquids.  +flatus/stool.  No CP, SOB.  Ambulating.      No pelvic pain    Objective:  General appearance: alert, appears stated age, and cooperative  Blood pressure 143/70, pulse 84, temperature 98.3 °F (36.8 °C), temperature source Oral, resp. rate 16, height 5' 9\" (1.753 m), weight 123 lb 7.3 oz (56 kg), SpO2 97%.  Lungs: non-labored respirations  Abdomen: soft, mildly rounded (improved) with expected incisional tenderness.  Stoma productive with yellow urine (UOP - 3020 mL/24 hours), distal ends of catheters exiting stoma.  Colostomy with liquid stool (output = 600 mL/24 hours).  LISSETT drain drain with SS fluid (output - 130 mL/24 hours)         Assessment:    URINARY RETENTION, CHRONIC PELVIC PAIN  S/P RAL simple cystectomy and ileal conduit 24 with Dr. Cadena  Afebrile, VSS  AM labs pending  LISSETT drain creat 24 - 0.36  Pathology pending  Ambulating     ILEUS - improving/resolving  Obstructive series 24: Diffuse air-filled distended bowel throughout abdomen most likely represents diffuse ileus with distal obstruction not excluded.  Postoperative changes are noted     Plan:    Encouraged ambulation and use of IS x 10/hr  Advance diet as tolerated  Pain management - limit narcotics as able  Bowel regimen  Supportive care  Follow labs, temp, UOP, drain output  Lovenox 40 mg subcutaneous daily   Stoma nurse and hospitalist following    Possible DC tomorrow pending clinical course    Above discussed with patient, nurse, Dr. Cadena.    Madison Quintana,  ALESHIA Leung [facility]  Department of Urology  5/23/2024  6:08 AM    Addendum:  Recent Labs   Lab 05/21/24 0528 05/22/24  0516 05/23/24  0606   RBC 4.97 4.71 4.68   HGB 14.6 13.7 13.7   HCT 42.4 40.9 39.9   MCV 85.3 86.8 85.3   MCH 29.4 29.1 29.3   MCHC 34.4 33.5 34.3   RDW 12.7 12.9 12.7   NEPRELIM 8.68* 4.16 4.88   WBC 9.9 5.4 6.3   .0 240.0 233.0     Recent Labs   Lab 05/21/24 0528 05/22/24  0516 05/23/24  0606   * 93 98   BUN 26* 17 9   CREATSERUM 0.48* 0.43* 0.50*   EGFRCR 101 103 100   CA 8.3* 8.0* 8.5    140 134*   K 4.1 3.3* 3.8  3.8    112 105   CO2 23.0 23.0 24.0     No leukocytosis  Hgb stable 13.7  Serum creat 0.5    ANNE García  Urology     HTN (hypertension)

## 2024-05-23 NOTE — PROGRESS NOTES
[facility]   part of [facility] Hospitalist Progress Note     Frankie Lemus Patient Status:  Inpatient    1950 MRN [id number]   Location [facility] 3NW-A Attending Yael Cadena MD   Hosp Day # 6 PCP PEÑA EVANGELISTA     Follow Up:  The encounter diagnosis was Pelvic pain in female.    Subjective:     Patient has been ambulating with a walker, worried about ostomy care  Tolerating p.o. diet, good output from the colostomy  Objective:    Review of Systems:   10 point ROS completed and was negative, except for pertinent positive and negatives stated in subjective.    Vital signs:  Temp:  [98.3 °F (36.8 °C)-98.7 °F (37.1 °C)] 98.6 °F (37 °C)  Pulse:  [81-84] 81  Resp:  [16] 16  BP: (120-143)/(69-86) 120/69  SpO2:  [96 %-98 %] 98 %    Physical Exam:    Gen: No acute distress, alert and oriented x3, no focal neurologic deficits, appears tired  HEENT:  EOMI, OP clear, MMM  Pulm: Lungs clear bilaterally, normal respiratory effort  CV: Heart with regular rate and rhythm, no murmur.  Normal PMI.    Abd: Lap incisions clean.  +urostomy. +colostomy with solid stool noted abdominal distention has almost resolved, active bowel sounds  MSK: Full range of motion in extremities, no clubbing, no cyanosis  Skin: no rashes or lesions  Neuro:  Grossly intact, no sensory deficits       Diagnostic Data:    Labs:  Recent Labs   Lab 24  0805 24  0602 24  0524  0516 24  0606   WBC 12.5* 11.6* 9.9 5.4 6.3   HGB 15.0 15.9 14.6 13.7 13.7   MCV 89.0 85.6 85.3 86.8 85.3   .0 285.0 270.0 240.0 233.0       Recent Labs   Lab 24  0516 24  0606   * 93 98   BUN 26* 17 9   CREATSERUM 0.48* 0.43* 0.50*   CA 8.3* 8.0* 8.5    140 134*   K 4.1 3.3* 3.8  3.8    112 105   CO2 23.0 23.0 24.0       Estimated Creatinine Clearance: 89.9 mL/min (A) (based on SCr of 0.5 mg/dL (L)).    No results for input(s): \"PTP\", \"INR\"  in the last 168 hours.         COVID-19 Lab Results    COVID-19  No results found for: \"COVID19\"    Pro-Calcitonin  No results for input(s): \"PCT\" in the last 168 hours.    Cardiac  No results for input(s): \"TROP\", \"PBNP\" in the last 168 hours.    Creatinine Kinase  No results for input(s): \"CK\" in the last 168 hours.    Inflammatory Markers  No results for input(s): \"CRP\", \"ULI\", \"LDH\", \"DDIMER\" in the last 168 hours.    Imaging: Imaging data reviewed in Epic.    Medications:    baclofen  10 mg Oral BID    diazePAM  5 mg Oral Daily    enoxaparin  40 mg Subcutaneous Daily    sennosides  17.2 mg Oral Nightly    docusate sodium  100 mg Oral BID    [Held by provider] acetaminophen  650 mg Oral Q4H While awake    famotidine  20 mg Oral BID    Or    famotidine  20 mg Intravenous BID    Levothyroxine Sodium  125 mcg Oral Nightly       Assessment & Plan:      73 yr old female with PMH sig for interstitial cystitis, depression, anxiety, and hypothyroidism who presents s/p robotic assisted lap cystectomy with ileal conduit diversion and removal of port.  Unfortunately course complicated by postop ileus, now improving.     # Chronic urinary retention, interstitial cystitis   # Urostomy status  # Colostomy status  #Postop ileus versus distal obstruction-resolved  S/p robotic assisted lap cystectomy with ileal conduit diversion and removal of port  Post op care per    Encourage IS use  Obstructive series suggestive of postop ileus versus distal obstruction 5/20,-this is since resolved, good output from the colostomy  -Possible discharge in a.m.    # Hypothyroidism   Cont levothyroxine      # Generalized anxiety d/o  Cont diazepam PRN    Plan of care: inpt care.    Plan of care discussed with patient or family at bedside.  Emil dickerson MD  Hospitalist  [facility]      Supplementary Documentation:     Quality:  DVT Prophylaxis: lov subcutaneous   CODE status: FULL  [place]: no  Central line: no  If COVID testing is  negative, may discontinue isolation: yes     Estimated date of discharge: TBD  Discharge is dependent on: clinical course   At this point Ms. Lemus is expected to be discharge to: home     Plan of care discussed with pt and bedside RN.

## 2024-05-24 VITALS
BODY MASS INDEX: 18.28 KG/M2 | OXYGEN SATURATION: 94 % | RESPIRATION RATE: 18 BRPM | HEART RATE: 78 BPM | WEIGHT: 123.44 LBS | SYSTOLIC BLOOD PRESSURE: 117 MMHG | TEMPERATURE: 99 F | HEIGHT: 69 IN | DIASTOLIC BLOOD PRESSURE: 81 MMHG

## 2024-05-24 LAB
ANION GAP SERPL CALC-SCNC: 6 MMOL/L (ref 0–18)
BASOPHILS # BLD AUTO: 0.02 X10(3) UL (ref 0–0.2)
BASOPHILS NFR BLD AUTO: 0.3 %
BUN BLD-MCNC: 13 MG/DL (ref 9–23)
C DIFF TOX B STL QL: NEGATIVE
CALCIUM BLD-MCNC: 8.4 MG/DL (ref 8.5–10.1)
CHLORIDE SERPL-SCNC: 104 MMOL/L (ref 98–112)
CO2 SERPL-SCNC: 22 MMOL/L (ref 21–32)
CREAT BLD-MCNC: 0.54 MG/DL
EGFRCR SERPLBLD CKD-EPI 2021: 98 ML/MIN/1.73M2 (ref 60–?)
EOSINOPHIL # BLD AUTO: 0.27 X10(3) UL (ref 0–0.7)
EOSINOPHIL NFR BLD AUTO: 3.7 %
ERYTHROCYTE [DISTWIDTH] IN BLOOD BY AUTOMATED COUNT: 12.7 %
GLUCOSE BLD-MCNC: 105 MG/DL (ref 70–99)
HCT VFR BLD AUTO: 39.4 %
HGB BLD-MCNC: 14.3 G/DL
IMM GRANULOCYTES # BLD AUTO: 0.04 X10(3) UL (ref 0–1)
IMM GRANULOCYTES NFR BLD: 0.5 %
LYMPHOCYTES # BLD AUTO: 0.54 X10(3) UL (ref 1–4)
LYMPHOCYTES NFR BLD AUTO: 7.3 %
MCH RBC QN AUTO: 29.8 PG (ref 26–34)
MCHC RBC AUTO-ENTMCNC: 36.3 G/DL (ref 31–37)
MCV RBC AUTO: 82.1 FL
MONOCYTES # BLD AUTO: 0.63 X10(3) UL (ref 0.1–1)
MONOCYTES NFR BLD AUTO: 8.5 %
NEUTROPHILS # BLD AUTO: 5.89 X10 (3) UL (ref 1.5–7.7)
NEUTROPHILS # BLD AUTO: 5.89 X10(3) UL (ref 1.5–7.7)
NEUTROPHILS NFR BLD AUTO: 79.7 %
OSMOLALITY SERPL CALC.SUM OF ELEC: 274 MOSM/KG (ref 275–295)
PLATELET # BLD AUTO: 285 10(3)UL (ref 150–450)
POTASSIUM SERPL-SCNC: 3 MMOL/L (ref 3.5–5.1)
RBC # BLD AUTO: 4.8 X10(6)UL
SODIUM SERPL-SCNC: 132 MMOL/L (ref 136–145)
WBC # BLD AUTO: 7.4 X10(3) UL (ref 4–11)

## 2024-05-24 PROCEDURE — 85025 COMPLETE CBC W/AUTO DIFF WBC: CPT | Performed by: HOSPITALIST

## 2024-05-24 PROCEDURE — 80048 BASIC METABOLIC PNL TOTAL CA: CPT | Performed by: HOSPITALIST

## 2024-05-24 PROCEDURE — 87493 C DIFF AMPLIFIED PROBE: CPT | Performed by: HOSPITALIST

## 2024-05-24 RX ORDER — SULFAMETHOXAZOLE AND TRIMETHOPRIM 800; 160 MG/1; MG/1
1 TABLET ORAL 2 TIMES DAILY
Qty: 6 TABLET | Refills: 0 | Status: SHIPPED | OUTPATIENT
Start: 2024-05-24 | End: 2024-05-27

## 2024-05-24 RX ORDER — POTASSIUM CHLORIDE 20 MEQ/1
40 TABLET, EXTENDED RELEASE ORAL EVERY 4 HOURS
Status: COMPLETED | OUTPATIENT
Start: 2024-05-24 | End: 2024-05-24

## 2024-05-24 NOTE — PROGRESS NOTES
[facility]  Urology Progress Note    Frankie Lemus Patient Status:  Inpatient    1950 MRN [id number]   [facility] 3NW-A Attending Yael Cadena MD   Hosp Day # 7 PCP PEÑA EVANGELISTA     Subjective:  Frankie Lemus is a(n) 73 year old female.    S/P RAL simple cystectomy and ileal conduit 24 with Dr. Cadena    Current complaints: Pain is controlled - using tylenol prn (although last dose few days ago).  No nausea, vomiting, fever, or chills.  Tolerating diet.  +flatus, BM.  Ambulating.  No CP, SOB, or calf pain.      Objective:  General appearance: alert, appears stated age, and cooperative  Blood pressure 112/64, pulse 84, temperature 98.4 °F (36.9 °C), temperature source Oral, resp. rate 16, height 5' 9\" (1.753 m), weight 123 lb 7.3 oz (56 kg), SpO2 95%.  Lungs: non-labored respirations  Abdomen: soft, minimal incisional tenderness.  Incisions C/D/I.  Stoma productive with yellow urine (UOP - 1900 mL/24 hours), distal ends of stents exiting stoma.  Colostomy with stool/gas in bag (output - 475 mL/24 hours)  Lab Results   Component Value Date    WBC 6.3 2024    HGB 13.7 2024    HCT 39.9 2024    .0 2024    CREATSERUM 0.50 2024    BUN 9 2024     2024    K 3.8 2024    K 3.8 2024     2024    CO2 24.0 2024    GLU 98 2024    CA 8.5 2024            Assessment:    URINARY RETENTION, CHRONIC PELVIC PAIN  S/P RAL simple cystectomy and ileal conduit 24 with Dr. Cadena  Afebrile, VSS  AM labs pending  LISSETT drain creat 24 - 0.36  Pathology - -Urothelial mucosa with chronic inflammation, focal lamina propria hemorrhage and edema.  -Negative for dysplasia or malignancy.  Ambulating     ILEUS - improving/resolving  Obstructive series 24: Diffuse air-filled distended bowel throughout abdomen most likely represents diffuse ileus with distal obstruction not excluded.   Postoperative changes are noted     Plan:    Encouraged ambulation and use of IS x 10/hr  Pain management  Follow labs, temp, UOP, drain output  Drain removal prior to discharge  Lovenox 40 mg subcutaneous daily   Stoma nurse and hospitalist following    Anticipate discharge today pending clinical course and approval from hospitalist.      Above discussed with patient, nurse  Will update Dr. Surinder Quintana PA-C  [facility]  Department of Urology  5/24/2024  5:57 AM    Addendum:  Recent Labs   Lab 05/22/24  0516 05/23/24  0606 05/24/24  0545   RBC 4.71 4.68 4.80   HGB 13.7 13.7 14.3   HCT 40.9 39.9 39.4   MCV 86.8 85.3 82.1   MCH 29.1 29.3 29.8   MCHC 33.5 34.3 36.3   RDW 12.9 12.7 12.7   NEPRELIM 4.16 4.88 5.89   WBC 5.4 6.3 7.4   .0 233.0 285.0       Recent Labs   Lab 05/22/24  0516 05/23/24  0606 05/24/24  0545   GLU 93 98 105*   BUN 17 9 13   CREATSERUM 0.43* 0.50* 0.54*   EGFRCR 103 100 98   CA 8.0* 8.5 8.4*    134* 132*   K 3.3* 3.8  3.8 3.0*    105 104   CO2 23.0 24.0 22.0     No leukocytosis  Hgb stable  Serum creat ok  Patient seen with Dr. Cadena.  Discharge today if ok with hospitalist.    Drain to be removed prior to discharge  Patient to follow-up with Dr. Cadena as scheduled 6/3/24 - ureteral catheters to be removed at that time unless they have already fallen out.      Above discussed with patient, nurse.    ANNE García  Urology    Addendum:  Update stool output - 825 mL/24 hours.      CDIFF pending  Hold stool softeners    ANNE García  Urology

## 2024-05-24 NOTE — PROGRESS NOTES
NURSING DISCHARGE NOTE    Discharged Home via Wheelchair.  Accompanied by Spouse  Belongings Taken by patient/family.    Patient discharged home in stable condition. Patient left the floor via wheelchair and was accompanied by . Discharge instructions given and were explained in detail. Prescription for Bactrim was e-prescribed to patient's pharmacy-educated on the use and instructed not to start until the day before stent removal. Educated patient on activity restrictions, incision care, and on signs of infection. Performed ostomy changes to colostomy and urostomy and walked patient through-extra supplies sent home for urostomy. Instructed patient to schedule a follow up appointment with  on 6/3 and with her PCP in 1-2 weeks. Patient stated understanding of discharge instructions and had no further questions at this time. Saline lock removed.

## 2024-05-24 NOTE — CM/SW NOTE
05/24/24 0900   Discharge disposition   Expected discharge disposition Home-Health   Post Acute Care Provider Residential   Discharge transportation Private car     Pt discussed with RN during rounds. Anticipating medical clearance today. SW notified liaisonHeather from [facility] of pt dc.    SW/CM to remain available for dc planning, and/or additional need for support.    LISA Alexandra  Discharge Planner  [phone]

## 2024-05-24 NOTE — DISCHARGE SUMMARY
General Medicine Discharge Summary     Patient ID:  Frankie Lemus  73 year old  11/26/1950    Admit date: 5/17/2024    Discharge date and time: 5/24/24    Attending Physician: Yael Cadena MD     Primary Care Physician: PEÑA EVANGELISTA     Reason for admission: see HPI    Discharge Diagnoses: URINARY RETENTION; PELVIC PAIN  Pelvic pain in female    Discharged Condition: good    Exam: (see progress notes for full details)  No acute distress, alert and oriented    Hospital Course: Patient is a 73 year old female with PMH sig for interstitial cystitis, depression, anxiety, and hypothyroidism who presents s/p robotic assisted lap cystectomy with ileal conduit diversion and removal of port.  She tolerated the procedure well without immediate complications.  Pt denies nausea, c/o post op pain.  No F/C, N/V.     73 yr old female with PMH sig for interstitial cystitis, depression, anxiety, and hypothyroidism who presents s/p robotic assisted lap cystectomy with ileal conduit diversion and removal of port.  Unfortunately course complicated by postop ileus, now improving.     # Chronic urinary retention, interstitial cystitis   # Urostomy status  # Colostomy status  #Postop ileus versus distal obstruction-resolved  S/p robotic assisted lap cystectomy with ileal conduit diversion and removal of port  Post op care per    Encourage IS use  Obstructive series suggestive of postop ileus versus distal obstruction 5/20,-this is since resolved, good output from the colostomy  Discharge today  Increased output from the colostomy noted however C. difficile is negative okay to discharge follow-up with [facility] as an outpatient     # Hypothyroidism   Cont levothyroxine      # Generalized anxiety d/o  Cont diazepam PRN      Consults: IP CONSULT TO HOSPITALIST  CONSULT TO WOUND OSTOMY  IP CONSULT TO SOCIAL WORK  CONSULT TO WOUND OSTOMY    Operative Procedures: Procedure(s) (LRB):  XI ROBOTIC ASSISTED LAPAROSCOPIC SIMPLE CYSTECTOMY,  ILEAL CONDUIT DIVERSION (MARIO ) REMOVAL OF POWER PORT (HIRO) (N/A)  REMOVAL OF POWER PORT (N/A)     Disposition: home    Patient Instructions:   Current Discharge Medication List        START taking these medications    Details   sulfamethoxazole-trimethoprim -160 MG Oral Tab per tablet Take 1 tablet by mouth 2 (two) times daily for 3 days. Start one day prior to stent removal           CONTINUE these medications which have NOT CHANGED    Details   NON FORMULARY See Admin Instructions. THC 5mg 1:1 CBD uses 1 square once at night.      Multiple Vitamin (MULTIVITAMIN OR) Take by mouth See Admin Instructions. Liquid squirt bottle called Quick Silver once a day.      diazePAM 5 MG Oral Tab Take 1 tablet (5 mg total) by mouth daily.      Levothyroxine Sodium (TIROSINT-SOL) 125 MCG/ML Oral Solution Take 125 mcg by mouth daily. TAKE CONtENTS OF ONE AMPULE BY MOUTH DAILY ON AN EMPTY STOMACH      Syringe, Disposable, (SHIRA SYRINGE) 70 ML Does not apply Misc Use as directed           STOP taking these medications       baclofen 10 MG Oral Tab        Cholecalciferol (VITAMIN D3) 5000 UNIT Oral Cap        NALTREXONE HCL OR                I reconciled current and discharge medications on day of discharge    Follow-up with PCP, urology    No orders of the defined types were placed in this encounter.      Follow-up with labs: BMP    Total Time Coordinating Care: Greater than 30 minutes    Patient had opportunity to ask questions and state understand and agree with therapeutic plan as outlined above.     Emil Viera MD

## 2024-05-24 NOTE — PROGRESS NOTES
1126: Paged  to inform her of c-diff being negative and inquire about discharge clearance. Orders placed.

## 2024-05-24 NOTE — PLAN OF CARE
Received pt at 1930. Pt is A&O x 4; follows commands seen walking hallway for 4th time at HS and in general sounding more confident and positive about discharging home. Pt is on RA, VSS, advanced to soft fiber diet. Still bloated but not feeling nauseous. Pt has ileal conduit to vargas bag and colostomy from 2/2023. Output from both is now WDL. Pt denies pain and ambulates with standby assist. IV access is patent currently . Shift assessment performed, HS meds given; pt finds they are much easier to digest when crushed in applesauce. NOC safety plan in place; bed in low position, call light and personal items within reach, pt encouraged to call staff for assistance.     POC  Discharge home when cleared.

## 2024-05-31 ENCOUNTER — TELEPHONE (OUTPATIENT)
Dept: WOUND CARE | Facility: HOSPITAL | Age: 74
End: 2024-05-31

## 2024-05-31 NOTE — TELEPHONE ENCOUNTER
Ostomy care discharge  follow up :    Attempted to call the patient.Left a message to return the call.

## 2024-06-13 ENCOUNTER — TELEPHONE (OUTPATIENT)
Dept: WOUND CARE | Facility: HOSPITAL | Age: 74
End: 2024-06-13

## 2024-06-13 NOTE — TELEPHONE ENCOUNTER
Spoke to patient about the new urostomy. Patient states she has trouble keeping the bag on, she states she has home health and one of the nurses has been able to help with the bag by changing the type of the bag used. Patient states she has follow up with surgeon on 06/03 and has an appointment scheduled with  at [facility]. Patient states she has not received a call from [facility], probably because she is currently with home health.

## 2024-06-21 ENCOUNTER — APPOINTMENT (OUTPATIENT)
Dept: WOUND CARE | Facility: HOSPITAL | Age: 74
End: 2024-06-21
Attending: NURSE PRACTITIONER
Payer: MEDICARE

## (undated) DEVICE — SUT PERMA- 0 30IN NABSRB BLK TIE SILK

## (undated) DEVICE — POWER SHELL: Brand: SIGNIA

## (undated) DEVICE — AIRSEAL 12 MM ACCESS PORT AND PALM GRIP OBTURATOR WITH BLADELESS OPTICAL TIP, 120 MM LENGTH: Brand: AIRSEAL

## (undated) DEVICE — SUT PERMA- 3-0 30IN SH NABSRB BLK 26MM 1/2

## (undated) DEVICE — 40580 - THE PINK PAD - ADVANCED TRENDELENBURG POSITIONING KIT: Brand: 40580 - THE PINK PAD - ADVANCED TRENDELENBURG POSITIONING KIT

## (undated) DEVICE — VESSEL SEALER EXTEND: Brand: ENDOWRIST

## (undated) DEVICE — ENDOPATH ULTRA VERESS INSUFFLATION NEEDLES WITH LUER LOCK CONNECTORS: Brand: ENDOPATH

## (undated) DEVICE — SUT COAT VCRL 4-0 27IN RB-1 ABSRB VLT 17MM 1/

## (undated) DEVICE — GLOVE,SURG,SENSICARE SLT,LF,PF,6.5: Brand: MEDLINE

## (undated) DEVICE — COVER LT HNDL RIG FOR SUR CAM DISP

## (undated) DEVICE — SUT PROL 4-0 36IN RB-1 NABSRB BLU 17MM 1/2 CI

## (undated) DEVICE — SPONGE: SPECIALTY PEANUT XR 100/CS: Brand: MEDICAL ACTION INDUSTRIES

## (undated) DEVICE — SUT COAT VCRL+ 0 27IN UR-6 ABSRB VLT ANTIBACT

## (undated) DEVICE — PROGRASP FORCEPS: Brand: ENDOWRIST

## (undated) DEVICE — ABSORBABLE WOUND CLOSURE DEVICE: Brand: V-LOC 90

## (undated) DEVICE — GAUZE,SPONGE,4"X4",12PLY,STERILE,LF,2'S: Brand: MEDLINE

## (undated) DEVICE — FENESTRATED BIPOLAR FORCEPS: Brand: ENDOWRIST

## (undated) DEVICE — VIOLET BRAIDED (POLYGLACTIN 910), SYNTHETIC ABSORBABLE SUTURE: Brand: COATED VICRYL

## (undated) DEVICE — SLEEVE COMPR MD KNEE LEN SGL USE KENDALL SCD

## (undated) DEVICE — UNDYED BRAIDED (POLYGLACTIN 910), SYNTHETIC ABSORBABLE SUTURE: Brand: COATED VICRYL

## (undated) DEVICE — SUT MCRYL 4-0 18IN PS-2 ABSRB UD 19MM 3/8 CIR

## (undated) DEVICE — 2-PIECE UROSTOMY POUCH: Brand: NEW IMAGE

## (undated) DEVICE — BLADELESS OBTURATOR: Brand: WECK VISTA

## (undated) DEVICE — ADHESIVE SKIN TOP FOR WND CLSR DERMBND ADV

## (undated) DEVICE — TIP COVER ACCESSORY

## (undated) DEVICE — ARTICULATING RELOAD WITH TRI-STAPLE TECHNOLOGY: Brand: ENDO GIA

## (undated) DEVICE — SUT CHRM GUT 3-0 27IN SH ABSRB UD 26MM 1/2

## (undated) DEVICE — AIRSEAL TRI-LUMEN LILTERED TUBE SET: Brand: AIRSEAL

## (undated) DEVICE — #15 STERILE STAINLESS BLADE: Brand: STERILE STAINLESS BLADES

## (undated) DEVICE — MONOPOLAR CURVED SCISSORS: Brand: ENDOWRIST

## (undated) DEVICE — ROBOTIC UROLOGY PROSTATE: Brand: MEDLINE INDUSTRIES, INC.

## (undated) DEVICE — Device

## (undated) DEVICE — GLOVE SUR 7.5 SENSICARE PI PIP CRM PWD F

## (undated) DEVICE — COLUMN DRAPE

## (undated) DEVICE — AGENT HEMSTAT 4X4IN OXIDIZED REGENERATED

## (undated) DEVICE — #11 STERILE BLADE: Brand: POLYMER COATED BLADES

## (undated) DEVICE — DRAIN SUR 15FR L3/16IN DIA4.7MM SIL RND

## (undated) DEVICE — ANCHOR TISSUE RETRIEVAL SYSTEM, BAG SIZE 175 ML, PORT SIZE 10 MM: Brand: ANCHOR TISSUE RETRIEVAL SYSTEM

## (undated) DEVICE — EVACUATOR SUR 100CC SIL BLB WND

## (undated) DEVICE — DRAPE,TOP,102X53,STERILE: Brand: MEDLINE

## (undated) DEVICE — CANNULA SEAL

## (undated) DEVICE — SYRINGE MED 5ML STD CLR PLAS LL TIP N CTRL

## (undated) DEVICE — SOLUTION IRRIG 1000ML 0.9% NACL USP BTL

## (undated) DEVICE — LARGE NEEDLE DRIVER: Brand: ENDOWRIST

## (undated) DEVICE — TISSUE RETRIEVAL SYSTEM: Brand: INZII RETRIEVAL SYSTEM

## (undated) DEVICE — ARM DRAPE

## (undated) DEVICE — C-ARM: Brand: UNBRANDED

## (undated) DEVICE — SOLUTION IV 1000ML DIL ST H2O

## (undated) DEVICE — SUT PROL 2-0 30IN CT-2 NABSRB BLU L26MM 1/2 C

## (undated) DEVICE — APPLICATOR PREP 26ML CHG 2% ISO ALC 70%

## (undated) DEVICE — UROSTOMY DRAIN TUBE ADAPTER: Brand: HOLLISTER

## (undated) DEVICE — ANTIBACTERIAL UNDYED BRAIDED (POLYGLACTIN 910), SYNTHETIC ABSORBABLE SUTURE: Brand: COATED VICRYL

## (undated) DEVICE — LAPAROVUE VISIBILITY SYSTEM LAPAROSCOPIC SOLUTIONS: Brand: LAPAROVUE

## (undated) NOTE — LETTER
CLARIFICATION FOR E-SSS    To: Dr Cadena     Patient Name: Frankie Lemus-Age / Sex: 1950-A: 73 y  female   Medical Records: [id number] [facility]: [id number]      Procedure Description:  XI ROBOTIC ASSISTED LAPAROSCOPIC SIMPLE CYSTECTOMY, ILEAL CONDUIT DIVERSION    Please note that clarification is needed on the Electronic-Surgery Scheduling Sheet (E-SSS)  the original is included with this letter. Please have physician review and make changes on the faxed copy of the E-SSS.    Procedure per patient should be include REMOVAL OF PORT-A-CATHETER        Please review and submit change if needed        ALL CHANGES MUST BE DOCUMENTED ON THE E-SSS AND SIGNED BY THE PHYSICIAN    After physician has made the changes and signed the E-SSS please fax it to [phone] and these changes will then be made in Epic by the OR schedulers.     If you have any questions please call Pre-Admission Testing at [phone]    Thank you